# Patient Record
Sex: FEMALE | Race: BLACK OR AFRICAN AMERICAN | Employment: FULL TIME | URBAN - METROPOLITAN AREA
[De-identification: names, ages, dates, MRNs, and addresses within clinical notes are randomized per-mention and may not be internally consistent; named-entity substitution may affect disease eponyms.]

---

## 2017-02-27 ENCOUNTER — OFFICE VISIT (OUTPATIENT)
Dept: ONCOLOGY | Age: 42
End: 2017-02-27

## 2017-02-27 VITALS
DIASTOLIC BLOOD PRESSURE: 76 MMHG | HEIGHT: 65 IN | HEART RATE: 60 BPM | OXYGEN SATURATION: 95 % | RESPIRATION RATE: 18 BRPM | TEMPERATURE: 97.5 F | SYSTOLIC BLOOD PRESSURE: 112 MMHG

## 2017-02-27 DIAGNOSIS — T45.1X5A HOT FLASHES DUE TO TAMOXIFEN: ICD-10-CM

## 2017-02-27 DIAGNOSIS — D69.6 THROMBOCYTOPENIA (HCC): ICD-10-CM

## 2017-02-27 DIAGNOSIS — C50.911 MALIGNANT NEOPLASM OF RIGHT FEMALE BREAST, UNSPECIFIED SITE OF BREAST: Primary | ICD-10-CM

## 2017-02-27 DIAGNOSIS — Z12.39 BREAST SCREENING: ICD-10-CM

## 2017-02-27 DIAGNOSIS — G62.9 NEUROPATHY: ICD-10-CM

## 2017-02-27 DIAGNOSIS — R23.2 HOT FLASHES DUE TO TAMOXIFEN: ICD-10-CM

## 2017-02-27 NOTE — MR AVS SNAPSHOT
Visit Information Date & Time Provider Department Dept. Phone Encounter #  
 2/27/2017  9:00 AM Rayshawn Reyna MD [facility] at [address] [place] [address] Rd [id number] Follow-up Instructions Return in about 6 months (around 8/27/2017) for 6m juwan grimm. Follow-up and Disposition History Your Appointments 5/19/2017 10:00 AM  
Follow Up with Pearl Castro NP [facility] ([facility] SCHEDULING) Appt Note: annual follow up per Anitha Jaime [place] [address]  
  
   
 [place] [address] [place] [address] [facility] Upcoming Health Maintenance Date Due Pneumococcal 19-64 Highest Risk (1 of 3 - PCV13) 10/21/1992 DTaP/Tdap/Td series (1 - Tdap) 10/21/1994 PAP AKA CERVICAL CYTOLOGY 10/21/1994 INFLUENZA AGE 9 TO ADULT 8/1/2016 Allergies as of 2/27/2017  Review Complete On: 2/27/2017 By: Rayshawn Reyna MD  
  
 Severity Noted Reaction Type Reactions Morphine  07/19/2013    Other (comments) Upset stomach Current Immunizations  Reviewed on 1/21/2014 No immunizations on file. Not reviewed this visit You Were Diagnosed With   
  
 Codes Comments Malignant neoplasm of right female breast, unspecified site of breast ([facility]    -  Primary ICD-10-CM: C50.911 ICD-9-CM: 174.9 Hot flashes due to tamoxifen     ICD-10-CM: R23.2, T45.1X5A 
ICD-9-CM: 782.62, E933.1 Neuropathy     ICD-10-CM: G62.9 ICD-9-CM: 355.9 Thrombocytopenia ([facility]     ICD-10-CM: D69.6 ICD-9-CM: 287.5 Breast screening     ICD-10-CM: Z12.39 
ICD-9-CM: V76.10 Vitals BP  
  
  
  
  
  
 [address] Preferred Pharmacy Pharmacy Name Phone [id number] [address] [phone] Your Updated Medication List  
  
   
This list is accurate as of: 2/27/17  9:56 AM.  Always use your most recent med list.  
  
  
 AZOPT 1 % ophthalmic suspension Generic drug:  brinzolamide Administer 1 Drop to both eyes two (2) times a day. COMBIGAN 0.2-0.5 % Drop ophthalmic solution Generic drug:  brimonidine-timolol Administer 1 Drop to both eyes two (2) times a day. LUMIGAN 0.03 % ophthalmic drops Generic drug:  bimatoprost  
Administer 1 Drop to both eyes every evening. TOPAMAX 100 mg tablet Generic drug:  topiramate Take  by mouth as needed (migraines). Follow-up Instructions Return in about 6 months (around 8/27/2017) for juwan ponce. To-Do List   
 07/17/2017 Imaging:  [facility] MAMMO LT SCREENING INCL CAD Patient Instructions Come see us in 6 months. Introducing [facility]! Dear Liberty Queen: Thank you for requesting a Tapit account. Our records indicate that you already have an active Tapit account. You can access your account anytime at [email or web address] Did you know that you can access your hospital and ER discharge instructions at any time in Tapit? You can also review all of your test results from your hospital stay or ER visit. Additional Information If you have questions, please visit the Frequently Asked Questions section of the Tapit website at [email or web address] Remember, Tapit is NOT to be used for urgent needs. For medical emergencies, dial 911. Now available from your iPhone and Android! Please provide this summary of care documentation to your next provider. Your primary care clinician is listed as Rlaph Gipson. If you have any questions after today's visit, please call [phone].

## 2017-02-27 NOTE — PROGRESS NOTES
[address], [address]  [address]  W: [phone]  F: [phone]     F/u  HEME/ONC CONSULT    Reason for visit: management of  Follow-up (breast cancer)    Referring physician:  Dr. Reyna Quezada physician:  Dr. Luisito Copeland, Dr. Lowell Larkin (fertility)    HPI:   Manju Chawla is a 35 y.o.  female who I am seeing in f/u for management of breast cancer. She found the right breast mass herself, and had a biopsy on 13 showing IDC, 1 cm, gr 2-3, ER + at 82%, RI neg at < 1%, ki67 62%, her 2 neg at 1+. Had a right mastectomy on 13 showing IDC 2.5 cm, gr 2, no LVI, 2/7 LN involved with no CAROLYN but greatest size 0.8 cm, ER + at 80%, RI negative, HER 2 neg ratio 1.1, ki67 80%. There was also a separate focus of IDC, 2 cm, gr 3, HER 2 negative    S/p DD AC-T q 2 weeks x 4 (60/600 then 175 mg/m2) from 10/28/13-2/3/14    S/p XRT 14    Started tamoxifen 14, been off since 2015    Last eye exam 11/2/15  Last gyn exam summer 2015    LMP 9/12/16    2/3/15 reconstruction: Benign scar    Interval history: complains of gr 1 fatigue, gr 1 hot flashes, gr 1 insomnia, gr 1 pain/cramping, 4/10 pain in her legs and upper extremities, gr 1 neuropathy, gr 1 swelling, gr 2 headache, gr 2 urgency, gr 2 incontinence. She is still trying to conceive. To see Dr. Gabriele Mcdonnell in March. Has not taken tamoxifen since 2015, due to trying to get pregnant. Started trying to conceive in 2016. [facility] Harshad Simmons [facility]. DX   Encounter Diagnoses   Name Primary?     Malignant neoplasm of right female breast, unspecified site of breast ([address] Yes    Hot flashes due to tamoxifen     Neuropathy     Thrombocytopenia ([address]     Breast screening      Past Medical History:   Diagnosis Date    Anxiety     Bowel obstruction ([facility]     Cancer ([facility]     cancer in right breast    Depression     FH: breast cancer     maternal grandmother,  age 37  GERD (gastroesophageal reflux disease)     Glaucoma     Headache     Hypersomnolent     Joint pain     Leg swelling     Muscle weakness     RIGHT ARM R/T SHOULDER PAIN    Other ill-defined conditions(799.89)     glaucoma, NEUROPATHY    Rectal fissure 2010    Uterine polyp 2005    Visual disturbance      Past Surgical History:   Procedure Laterality Date    HX APPENDECTOMY  1977    HX BREAST RECONSTRUCTION  2013    BREAST RECONSTRUCTION performed by Chantelle Singer MD at [address] HX CYST REMOVAL  2010    ganglion cyst RIGHT wrist    HX GYN  2005    uterine polyp removed, leep    HX GYN      LEEP    HX HEENT  2010    eye surgery    HX MASTECTOMY Right     HX MODIFIED RADICAL MASTECTOMY  2013    BREAST MASTECTOMY MODIFIED RADICAL performed by Olayinka Antunez MD at [address] HX OOPHORECTOMY  2008    removal of LEFT ovary and fallopian tube    HX OTHER SURGICAL      ganglion cysts removed wrists    HX OTHER SURGICAL  2010    anal fissure removed.     HX SMALL BOWEL RESECTION  2010    small bowel obstruction      Social History     Social History    Marital status:      Spouse name: N/A    Number of children: N/A    Years of education: N/A     Social History Main Topics    Smoking status: Never Smoker    Smokeless tobacco: Never Used    Alcohol use No    Drug use: No    Sexual activity: Yes     Partners: Male     Birth control/ protection: None     Other Topics Concern    None     Social History Narrative     Family History   Problem Relation Age of Onset    Cancer Maternal Grandmother      breast cancer,  age 37   [employer] Malignant Hyperthermia Neg Hx     Pseudocholinesterase Deficiency Neg Hx     Delayed Awakening Neg Hx     Post-op Nausea/Vomiting Neg Hx     Emergence Delirium Neg Hx     Post-op Cognitive Dysfunction Neg Hx     Other Neg Hx     Anesth Problems Neg Hx      Current Outpatient Prescriptions   Medication Sig Dispense Refill    topiramate (TOPAMAX) 100 mg tablet Take  by mouth as needed (migraines).  brimonidine-timolol (COMBIGAN) 0.2-0.5 % Drop ophthalmic solution Administer 1 Drop to both eyes two (2) times a day.  brinzolamide (AZOPT) 1 % ophthalmic suspension Administer 1 Drop to both eyes two (2) times a day.  bimatoprost (LUMIGAN) 0.03 % ophthalmic drops Administer 1 Drop to both eyes every evening. Allergies   Allergen Reactions    Morphine Other (comments)     Upset stomach     Review of Systems    A comprehensive review of systems was performed and all systems were negative except for HPI and for the symptom report form, reviewed and scanned in    Objective:  Physical Exam:  Visit Vitals    /76    Pulse 60    Temp 97.5 °F (36.4 °C)    Resp 18    Ht 5' 5\" (1.651 m)    SpO2 95%       General:  Alert, cooperative, no distress, appears stated age. Head:  Normocephalic, without obvious abnormality, atraumatic. Eyes:  Conjunctivae/corneas clear. PERRL, EOMs intact. Throat: Lips, mucosa, and tongue normal.    Neck: Supple, symmetrical, trachea midline, no adenopathy, thyroid: no enlargement/tenderness/nodules   Back:   Symmetric, no curvature. ROM normal. No CVA tenderness. Lungs:   Clear to auscultation bilaterally. Chest wall:  No tenderness or deformity. Heart:  Regular rate and rhythm, S1, S2 normal, no murmur, click, rub or gallop. Breast Exam:  S/p right mastectomy with tissue expander in place. Abdomen:   Soft, non-tender. Bowel sounds normal. No masses,  No organomegaly. Extremities: Extremities normal, atraumatic, no cyanosis or edema noted. Skin: Skin color, texture, turgor normal. No rashes or lesions. Lymph nodes: Cervical, supraclavicular, and axillary nodes normal.   Neurologic: CNII-XII intact.        Diagnostic Imaging   10/21/13 TTE: EF 60%    7/6/15 L mammogram, negative, f/u in 1 year    July 6[facility]    Lab Results  Lab Results   Component Value Date/Time    WBC 3.6 09/28/2016 09:59 AM    HGB 12.2 09/28/2016 09:59 AM    HCT 38.9 09/28/2016 09:59 AM    PLATELET 771 59/51/6552 09:59 AM    MCV 71.9 09/28/2016 09:59 AM     Lab Results   Component Value Date/Time    Sodium 140 01/20/2015 03:59 PM    Potassium 3.9 01/20/2015 03:59 PM    Chloride 107 01/20/2015 03:59 PM    CO2 28 01/20/2015 03:59 PM    Anion gap 5 01/20/2015 03:59 PM    Glucose 78 01/20/2015 03:59 PM    BUN 19 01/20/2015 03:59 PM    Creatinine 0.64 01/20/2015 03:59 PM    BUN/Creatinine ratio 30 01/20/2015 03:59 PM    GFR est AA >60 01/20/2015 03:59 PM    GFR est non-AA >60 01/20/2015 03:59 PM    Calcium 9.1 01/20/2015 03:59 PM    AST (SGOT) 22 01/20/2015 03:59 PM    Alk. phosphatase 54 01/20/2015 03:59 PM    Protein, total 7.3 01/20/2015 03:59 PM    Albumin 4.0 01/20/2015 03:59 PM    Globulin 3.3 01/20/2015 03:59 PM    A-G Ratio 1.2 01/20/2015 03:59 PM    ALT (SGPT) 25 01/20/2015 03:59 PM     Assessment/Plan:  35 y.o. female with multifocal IDC on the right, 2.5 cm and 2 cm, gr 3, ER positive, MA negative, HER 2 negative, 2/7 LN involved. [id number]. Mx  PS 0   BRCA 1/2 and ELIZABETH neg. 1. Breast cancer stage: IIA    Hormonal therapy: administered    No evidence of recurrence. She has stopped her tamoxifen while attempting to get pregnant. Retrospective studies show that becoming pregnant after breast cancer does not equal worse outcomes, even in the ER+ population. She is seeing a fertility specialist. I discussed that we would want to restart her tamoxifen following delivery, should that occur. I discussed that if she does not take her tamoxifen again, her risk of breast cancer recurrence would nearly double, from about 25% to 49%. I am concerned that she will not be able to obtain pregnancy spontaneously, thus, delaying her time to resume tamoxifen. I have reviewed Dr. Ramírez Amaya' note and highly recommend that she return.  She will see Dr. Maria Larios in March. We discussed that there in theory is a risk with fertility treatment with breast cancer, but this is in theory, and it is difficult to quantify the risk. I agree with the approach of \"natural\" conception, but if fertility medications are required, the patient understands that there is an unknown risk, but that in this situation there are no strict contraindications. July 6, 2016 L mammogram normal per patient at the [place]. She would like to switch to getting her mammograms here, will order today, due 07/17. 2. Hot Flashes: improved, mild and intermittent. 3. Neuropathy: ongoing, she has stopped the gabapentin. She takes mobic as needed. 4. Thrombocytopenia: resolved, 172 in 09/2016. if it goes < 100, will order bone marrow biopsy    > 25 minutes were spent with this patient with > 50% of that time spent in face to face counseling. Follow-up Disposition:  Return in about 6 months (around 8/27/2017) for 6m juwan grimm.     Ángel Winters MD

## 2017-02-27 NOTE — PROGRESS NOTES
Leandra Narvaez is a 35 y.o. female  Chief Complaint   Patient presents with    Follow-up     breast cancer

## 2017-05-26 ENCOUNTER — OFFICE VISIT (OUTPATIENT)
Dept: SURGERY | Age: 42
End: 2017-05-26

## 2017-05-26 VITALS
HEART RATE: 70 BPM | BODY MASS INDEX: 32.82 KG/M2 | DIASTOLIC BLOOD PRESSURE: 82 MMHG | WEIGHT: 197 LBS | HEIGHT: 65 IN | SYSTOLIC BLOOD PRESSURE: 116 MMHG

## 2017-05-26 DIAGNOSIS — Z85.3 HISTORY OF BREAST CANCER IN FEMALE: ICD-10-CM

## 2017-05-26 DIAGNOSIS — R92.2 DENSE BREAST TISSUE: Primary | ICD-10-CM

## 2017-05-26 NOTE — PROGRESS NOTES
HISTORY OF PRESENT ILLNESS  Abran Majano is a 35 y.o. female.   HPI       ROS    Physical Exam    ASSESSMENT and PLAN  {ASSESSMENT/PLAN:[id number]}

## 2017-05-26 NOTE — MR AVS SNAPSHOT
Visit Information Date & Time Provider Department Dept. Phone Encounter #  
 5/26/2017  9:30 AM John Hurst NP [facility] [address] [id number] Your Appointments 8/28/2017  9:00 AM  
ESTABLISHED PATIENT with Damaris Pantoja MD  
[facility] at [facility] [address]) Appt Note: 6 mo fu, Quintin Francisca [address] [place] [address], [address] [facility] [id number] [phone]  
  
   
 [address] [place] [address], [address] [place] [address]  
  
    
 6/1/2018  9:30 AM  
Follow Up with John Hurst NP [facility] ([facility] SCHEDULING) Appt Note: 1 year follow up/ cp$12 5-26-17 LS  
 [address]  
  
   
 [place] [address] [place] [address] [facility] Upcoming Health Maintenance Date Due Pneumococcal 19-64 Highest Risk (1 of 3 - PCV13) 10/21/1992 DTaP/Tdap/Td series (1 - Tdap) 10/21/1994 PAP AKA CERVICAL CYTOLOGY 10/21/1994 INFLUENZA AGE 9 TO ADULT 8/1/2017 Allergies as of 5/26/2017  Review Complete On: 5/26/2017 By: John Hurst NP Severity Noted Reaction Type Reactions Morphine  07/19/2013    Other (comments) Upset stomach Current Immunizations  Reviewed on 1/21/2014 No immunizations on file. Not reviewed this visit You Were Diagnosed With   
  
 Codes Comments Dense breast tissue    -  Primary ICD-10-CM: R92.2 ICD-9-CM: 793.82 History of breast cancer in female     ICD-10-CM: Z85.3 ICD-9-CM: V10.3 Vitals BP Pulse Height(growth percentile) Weight(growth percentile) LMP BMI  
 116/82 70 5' 5\" (1.651 m) 197 lb (89.4 kg) 05/08/2017 32.78 kg/m2 OB Status Smoking Status Having regular periods Never Smoker BMI and BSA Data Body Mass Index Body Surface Area 32.78 kg/m 2 2.02 m 2 Preferred Pharmacy Pharmacy Name Phone [id number] [address] [phone] Your Updated Medication List  
  
   
This list is accurate as of: 5/26/17  1:00 PM.  Always use your most recent med list.  
  
  
  
  
 AZOPT 1 % ophthalmic suspension Generic drug:  brinzolamide Administer 1 Drop to both eyes two (2) times a day. COMBIGAN 0.2-0.5 % Drop ophthalmic solution Generic drug:  brimonidine-timolol Administer 1 Drop to both eyes two (2) times a day. LUMIGAN 0.03 % ophthalmic drops Generic drug:  bimatoprost  
Administer 1 Drop to both eyes every evening. To-Do List   
 07/05/2017 9:30 AM  
  Appointment with [facility] 1 at [place] [address] Mammography ([phone]) Shower or bathe using soap and water. Do not use deodorant, powder, perfumes, or lotion the day of your exam.  If your prior mammograms were not performed at [facility] please bring films with you or forward prior images 2 days before your procedure. Check in at registration 15min before your appointment time unless you were instructed to do otherwise. A script is not necessary, but if you have one, please bring it on the day of the mammogram or have it faxed to the department. [facility] [phone] [facility]  [phone] [address]  [phone] [facility] [phone] [address] [phone] Patient Instructions Breast Self-Exam: Care Instructions Your Care Instructions A breast self-exam is when you check your breasts for lumps or changes. This regular exam helps you learn how your breasts normally look and feel. Most breast problems or changes are not because of cancer. Breast self-exam is not a substitute for a mammogram. Having regular breast exams by your doctor and regular mammograms improve your chances of finding any problems with your breasts.  
Some women set a time each month to do a step-by-step breast self-exam. Other women like a less formal system. They might look at their breasts as they brush their teeth, or feel their breasts once in a while in the shower. If you notice a change in your breast, tell your doctor. Follow-up care is a key part of your treatment and safety. Be sure to make and go to all appointments, and call your doctor if you are having problems. Its also a good idea to know your test results and keep a list of the medicines you take. How do you do a breast self-exam? 
· The best time to examine your breasts is usually one week after your menstrual period begins. Your breasts should not be tender then. If you do not have periods, you might do your exam on a day of the month that is easy to remember. · To examine your breasts: ¨ Remove all your clothes above the waist and lie down. When you are lying down, your breast tissue spreads evenly over your chest wall, which makes it easier to feel all your breast tissue. ¨ Use the padsnot the fingertipsof the 3 middle fingers of your left hand to check your right breast. Move your fingers slowly in small coin-sized circles that overlap. ¨ Use three levels of pressure to feel of all your breast tissue. Use light pressure to feel the tissue close to the skin surface. Use medium pressure to feel a little deeper. Use firm pressure to feel your tissue close to your breastbone and ribs. Use each pressure level to feel your breast tissue before moving on to the next spot. ¨ Check your entire breast, moving up and down as if following a strip from the collarbone to the bra line, and from the armpit to the ribs. Repeat until you have covered the entire breast. 
¨ Repeat this procedure for your left breast, using the pads of the 3 middle fingers of your right hand. · To examine your breasts while in the shower: 
¨ Place one arm over your head and lightly soap your breast on that side.  
¨ Using the pads of your fingers, gently move your hand over your breast (in the strip pattern described above), feeling carefully for any lumps or changes. ¨ Repeat for the other breast. 
· Have your doctor inspect anything you notice to see if you need further testing. Where can you learn more? Go to http://morgan-joel.info/. Enter P148 in the search box to learn more about \"Breast Self-Exam: Care Instructions. \" Current as of: July 26, 2016 Content Version: 11.2 © 4772-5797 [employer]. Care instructions adapted under license by ImaCor (which disclaims liability or warranty for this information). If you have questions about a medical condition or this instruction, always ask your healthcare professional. [address] any warranty or liability for your use of this information. Introducing [facility] & HEALTH SERVICES! Dear Ya Chacon: Thank you for requesting a ZMP account. Our records indicate that you already have an active ZMP account. You can access your account anytime at [email or web address] Did you know that you can access your hospital and ER discharge instructions at any time in ZMP? You can also review all of your test results from your hospital stay or ER visit. Additional Information If you have questions, please visit the Frequently Asked Questions section of the ZMP website at [email or web address] Remember, ZMP is NOT to be used for urgent needs. For medical emergencies, dial 911. Now available from your iPhone and Android! Please provide this summary of care documentation to your next provider. Your primary care clinician is listed as Juan Atkins. If you have any questions after today's visit, please call [phone].

## 2017-05-26 NOTE — PATIENT INSTRUCTIONS
Breast Self-Exam: Care Instructions  Your Care Instructions  A breast self-exam is when you check your breasts for lumps or changes. This regular exam helps you learn how your breasts normally look and feel. Most breast problems or changes are not because of cancer. Breast self-exam is not a substitute for a mammogram. Having regular breast exams by your doctor and regular mammograms improve your chances of finding any problems with your breasts. Some women set a time each month to do a step-by-step breast self-exam. Other women like a less formal system. They might look at their breasts as they brush their teeth, or feel their breasts once in a while in the shower. If you notice a change in your breast, tell your doctor. Follow-up care is a key part of your treatment and safety. Be sure to make and go to all appointments, and call your doctor if you are having problems. Its also a good idea to know your test results and keep a list of the medicines you take. How do you do a breast self-exam?  · The best time to examine your breasts is usually one week after your menstrual period begins. Your breasts should not be tender then. If you do not have periods, you might do your exam on a day of the month that is easy to remember. · To examine your breasts:  ¨ Remove all your clothes above the waist and lie down. When you are lying down, your breast tissue spreads evenly over your chest wall, which makes it easier to feel all your breast tissue. ¨ Use the pads--not the fingertips--of the 3 middle fingers of your left hand to check your right breast. Move your fingers slowly in small coin-sized circles that overlap. ¨ Use three levels of pressure to feel of all your breast tissue. Use light pressure to feel the tissue close to the skin surface. Use medium pressure to feel a little deeper. Use firm pressure to feel your tissue close to your breastbone and ribs.  Use each pressure level to feel your breast tissue before moving on to the next spot. ¨ Check your entire breast, moving up and down as if following a strip from the collarbone to the bra line, and from the armpit to the ribs. Repeat until you have covered the entire breast.  ¨ Repeat this procedure for your left breast, using the pads of the 3 middle fingers of your right hand. · To examine your breasts while in the shower:  ¨ Place one arm over your head and lightly soap your breast on that side. ¨ Using the pads of your fingers, gently move your hand over your breast (in the strip pattern described above), feeling carefully for any lumps or changes. ¨ Repeat for the other breast.  · Have your doctor inspect anything you notice to see if you need further testing. Where can you learn more? Go to http://morgan-joel.info/. Enter P148 in the search box to learn more about \"Breast Self-Exam: Care Instructions. \"  Current as of: July 26, 2016  Content Version: 11.2  © 3790-7930 [employer]. Care instructions adapted under license by RealTravel (which disclaims liability or warranty for this information). If you have questions about a medical condition or this instruction, always ask your healthcare professional. [place] [address] any warranty or liability for your use of this information.

## 2017-05-26 NOTE — PROGRESS NOTES
HISTORY OF PRESENT ILLNESS  Diana Atkins is a 35 y.o. female. HPI  ESTABLISHED patient here today for follow up RIGHT breast cancer. She has occasional breast tenderness. Denies any other breast problems at this time. History of breast cancer-  RIGHT breast IDC 1cm ER positive, UT negative, Her2 negative. 13- RIGHT mastectomy, SLNB with reconstruction by Dr. Dori Garcia  Completed adjuvant chemotherapy- DD AC-T from 10/28/13-2/3/14  2014- completed radiation. Followed by Dr. Anastasiia Rebolledo  Started tamoxifen 14, been off since 2015. Followed by Dr. Huy Sandra. 2015- final RIGHT breast reconstruction with Dr. Angela Toth. OB History     Obstetric Comments    Menarche:  4. LMP: 11. # of Children:  0. Age at Delivery of First Child:  n/a. Hysterectomy/oophorectomy:  NO/yes 1. Breast Bx:  yes. Hx of Breast Feeding:  no. BCP:  Yes [id number]. Hormone therapy:  no.        Past Surgical History:   Procedure Laterality Date    HX APPENDECTOMY      HX BREAST RECONSTRUCTION  2013    BREAST RECONSTRUCTION performed by Lori Beyer MD at [facility] CYST REMOVAL  2010    ganglion cyst RIGHT wrist    HX GYN  2005    uterine polyp removed, leep    HX GYN  1998    LEEP    HX HEENT  2010    eye surgery    HX MASTECTOMY Right     HX MODIFIED RADICAL MASTECTOMY  2013    BREAST MASTECTOMY MODIFIED RADICAL performed by Noel Chairez MD at [address] HX OOPHORECTOMY  2008    removal of LEFT ovary and fallopian tube    HX OTHER SURGICAL      ganglion cysts removed wrists    HX OTHER SURGICAL  2010    anal fissure removed.  HX SMALL BOWEL RESECTION  2010    small bowel obstruction        FH includes-   Maternal grandmother diagnosed with breast cancer,  from it age 37. Patient has had genetic testing- negative. Recent imaging-  LEFT screening mammogram in 2015- BIRADS 1  LEFT screening mammogram scheduled for 17. ROS    Physical Exam   Constitutional: She is oriented to person, place, and time. She appears well-developed and well-nourished. Pulmonary/Chest: Left breast exhibits no inverted nipple, no mass, no nipple discharge, no skin change and no tenderness. Musculoskeletal: Normal range of motion. UE x 2   Lymphadenopathy:     She has no cervical adenopathy. She has no axillary adenopathy. Right: No supraclavicular adenopathy present. Left: No supraclavicular adenopathy present. Neurological: She is alert and oriented to person, place, and time. Skin: Skin is warm, dry and intact. Chest and breasts examined   Psychiatric: She has a normal mood and affect. Her speech is normal and behavior is normal.     Visit Vitals    /82    Pulse 70    Ht 5' 5\" (1.651 m)    Wt 197 lb (89.4 kg)    LMP 05/08/2017    BMI 32.78 kg/m2     ASSESSMENT and PLAN  Encounter Diagnoses   Name Primary?  Dense breast tissue Yes    History of breast cancer in female      Normal exam in this patient with a history of right breast cancer. She had a left breast mammogram in 2016 at the [place] and reports that it was normal.  She is scheduled to have a L[facility]ammogram this summer at [facility]. She is currently off tamoxifen and working with YARIZTA to conceive. Dr. Bernardino Ibanez is aware. She will plan to RTC here in 1 year or sooner PRN. She has follow-up with Dr. Bernardino Ibanez in the interim. She is comfortable with this plan. All questions answered and she stated understanding.

## 2017-07-05 ENCOUNTER — HOSPITAL ENCOUNTER (OUTPATIENT)
Dept: MAMMOGRAPHY | Age: 42
Discharge: HOME | End: 2017-07-05
Attending: NURSE PRACTITIONER

## 2017-07-05 DIAGNOSIS — Z12.39 BREAST SCREENING: ICD-10-CM

## 2017-07-05 DIAGNOSIS — G62.9 NEUROPATHY: ICD-10-CM

## 2017-07-05 DIAGNOSIS — C50.911 MALIGNANT NEOPLASM OF RIGHT FEMALE BREAST, UNSPECIFIED SITE OF BREAST: ICD-10-CM

## 2017-07-05 DIAGNOSIS — D69.6 THROMBOCYTOPENIA (HCC): ICD-10-CM

## 2017-07-05 DIAGNOSIS — R23.2 HOT FLASHES DUE TO TAMOXIFEN: ICD-10-CM

## 2017-07-05 DIAGNOSIS — T45.1X5A HOT FLASHES DUE TO TAMOXIFEN: ICD-10-CM

## 2017-08-22 ENCOUNTER — HOSPITAL ENCOUNTER (OUTPATIENT)
Dept: MAMMOGRAPHY | Age: 42
Discharge: HOME | End: 2017-08-22
Attending: NURSE PRACTITIONER
Payer: OTHER GOVERNMENT

## 2017-08-22 DIAGNOSIS — R23.2 FLUSHING: ICD-10-CM

## 2017-08-22 DIAGNOSIS — T45.1X5A ANTINEOPLASTIC ANTIBIOTICS CAUSING ADVERSE EFFECT IN THERAPEUTIC USE: ICD-10-CM

## 2017-08-22 DIAGNOSIS — C50.911 MALIGNANT NEOPLASM OF RIGHT BREAST (HCC): ICD-10-CM

## 2017-08-22 DIAGNOSIS — Z12.39 SCREENING BREAST EXAMINATION: ICD-10-CM

## 2017-08-22 DIAGNOSIS — G62.9 ACQUIRED POLYNEUROPATHY: ICD-10-CM

## 2017-08-22 DIAGNOSIS — D59.6: ICD-10-CM

## 2017-08-22 PROCEDURE — 77067 SCR MAMMO BI INCL CAD: CPT

## 2017-08-28 ENCOUNTER — TELEPHONE (OUTPATIENT)
Dept: ONCOLOGY | Age: 42
End: 2017-08-28

## 2017-08-28 NOTE — TELEPHONE ENCOUNTER
Called patient and left a voicemail requesting a call back, to reschedule no show appointment on 8/28/17.

## 2017-08-28 NOTE — TELEPHONE ENCOUNTER
Patient called and stated that she had tried to reschedule her apt when she got a reminder call awhile ago on the phone. She rescheduled for 9/19/17 at 11:00AM with Dr. Corine Gutierrez.

## 2017-10-10 ENCOUNTER — OFFICE VISIT (OUTPATIENT)
Dept: ONCOLOGY | Age: 42
End: 2017-10-10

## 2017-10-10 VITALS
BODY MASS INDEX: 33.66 KG/M2 | SYSTOLIC BLOOD PRESSURE: 118 MMHG | WEIGHT: 202 LBS | HEART RATE: 70 BPM | DIASTOLIC BLOOD PRESSURE: 81 MMHG | OXYGEN SATURATION: 98 % | TEMPERATURE: 97.1 F | RESPIRATION RATE: 18 BRPM | HEIGHT: 65 IN

## 2017-10-10 DIAGNOSIS — T45.1X5A HOT FLASHES DUE TO TAMOXIFEN: ICD-10-CM

## 2017-10-10 DIAGNOSIS — R23.2 HOT FLASHES DUE TO TAMOXIFEN: ICD-10-CM

## 2017-10-10 DIAGNOSIS — D69.6 THROMBOCYTOPENIA (HCC): ICD-10-CM

## 2017-10-10 DIAGNOSIS — C50.911 MALIGNANT NEOPLASM OF RIGHT BREAST IN FEMALE, ESTROGEN RECEPTOR POSITIVE, UNSPECIFIED SITE OF BREAST (HCC): Primary | ICD-10-CM

## 2017-10-10 DIAGNOSIS — Z17.0 MALIGNANT NEOPLASM OF RIGHT BREAST IN FEMALE, ESTROGEN RECEPTOR POSITIVE, UNSPECIFIED SITE OF BREAST (HCC): Primary | ICD-10-CM

## 2017-10-10 DIAGNOSIS — G62.9 NEUROPATHY: ICD-10-CM

## 2017-10-10 RX ORDER — CLOMIPHENE CITRATE 50 MG/1
TABLET ORAL
Refills: 1 | COMMUNITY
Start: 2017-09-26

## 2017-10-10 NOTE — PROGRESS NOTES
[address]  [facility], [address] [place] [address] [address]  W: [phone]  F: [phone]     F/u  HEME/ONC CONSULT    Reason for visit: management of  No chief complaint on file. Referring physician:  Dr. Diogenes Oconnor physician:  Dr. Mathew Brewster, Dr. Elmira Clay (fertility)    HPI:   Breanna Peter is a 35 y.o.  female who I am seeing in f/u for management of breast cancer. She found the right breast mass herself, and had a biopsy on 13 showing IDC, 1 cm, gr 2-3, ER + at 82%, DC neg at < 1%, ki67 62%, her 2 neg at 1+. Had a right mastectomy on 13 showing IDC 2.5 cm, gr 2, no LVI, 2/7 LN involved with no CAROLYN but greatest size 0.8 cm, ER + at 80%, DC negative, HER 2 neg ratio 1.1, ki67 80%. There was also a separate focus of IDC, 2 cm, gr 3, HER 2 negative    S/p DD AC-T q 2 weeks x 4 (60/600 then 175 mg/m2) from 10/28/13-2/3/14    S/p XRT 14    Started tamoxifen 14, been off since 2015    Last eye exam 11/2/15  Last gyn exam:     LMP 9/12/16    2/3/15 reconstruction: Benign scar    Interval history: complains of gr 1 fatigue, gr 1 hot flashes, gr 1 insomnia, gr 1 pain/cramping, 5/10 pain in her legs and upper extremities, gr 2 neuropathy, gr 1 swelling, gr 2 headache, gr 2 urgency, gr 1 incontinence. She is seeing Dr. Cortes Wilson and is starting fertility meds at this time. DX   No diagnosis found.   Past Medical History:   Diagnosis Date    Anxiety     Bowel obstruction     Cancer [facility])     cancer in right breast    Depression     FH: breast cancer     maternal grandmother,  age 37    GERD (gastroesophageal reflux disease)     Glaucoma     Headache     Hypersomnolent     Joint pain     Leg swelling     Muscle weakness     RIGHT ARM R/T SHOULDER PAIN    Other ill-defined conditions(279.94)     glaucoma, NEUROPATHY    Radiation therapy complication     Rectal fissure     Uterine polyp     Visual disturbance Past Surgical History:   Procedure Laterality Date    HX APPENDECTOMY  1977    HX BREAST BIOPSY      HX BREAST RECONSTRUCTION  2013    BREAST RECONSTRUCTION performed by Chris Pineda MD at [address] HX CYST REMOVAL  2010    ganglion cyst RIGHT wrist    HX GYN  2005    uterine polyp removed, leep    HX GYN  1998    LEEP    HX HEENT  2010    eye surgery    HX MASTECTOMY Right     HX MODIFIED RADICAL MASTECTOMY  2013    BREAST MASTECTOMY MODIFIED RADICAL performed by Roberto Valdivia MD at [address] HX OOPHORECTOMY  2008    removal of LEFT ovary and fallopian tube    HX OTHER SURGICAL      ganglion cysts removed wrists    HX OTHER SURGICAL  2010    anal fissure removed.  HX SMALL BOWEL RESECTION  2010    small bowel obstruction      Social History     Social History    Marital status:      Spouse name: N/A    Number of children: N/A    Years of education: N/A     Social History Main Topics    Smoking status: Never Smoker    Smokeless tobacco: Never Used    Alcohol use No    Drug use: No    Sexual activity: Yes     Partners: Male     Birth control/ protection: None     Other Topics Concern    None     Social History Narrative     Family History   Problem Relation Age of Onset    Cancer Maternal Grandmother      breast cancer,  age 37    Breast Cancer Maternal Grandmother     Malignant Hyperthermia Neg Hx     Pseudocholinesterase Deficiency Neg Hx     Delayed Awakening Neg Hx     Post-op Nausea/Vomiting Neg Hx     Emergence Delirium Neg Hx     Post-op Cognitive Dysfunction Neg Hx     Other Neg Hx     Anesth Problems Neg Hx      Current Outpatient Prescriptions   Medication Sig Dispense Refill    clomiPHENE (CLOMID) 50 mg tablet TK 2 TS PO QD FOR 5 DAYS  1    brimonidine-timolol (COMBIGAN) 0.2-0.5 % Drop ophthalmic solution Administer 1 Drop to both eyes two (2) times a day.       brinzolamide (AZOPT) 1 % ophthalmic suspension Administer 1 Drop to both eyes two (2) times a day.  bimatoprost (LUMIGAN) 0.03 % ophthalmic drops Administer 1 Drop to both eyes every evening. Allergies   Allergen Reactions    Morphine Other (comments)     Upset stomach     Review of Systems    A comprehensive review of systems was performed and all systems were negative except for HPI and for the symptom report form, reviewed and scanned in    Objective:  Physical Exam:  Visit Vitals    Wt 202 lb (91.6 kg)    BMI 33.61 kg/m2       General:  Alert, cooperative, no distress, appears stated age. Head:  Normocephalic, without obvious abnormality, atraumatic. Eyes:  Conjunctivae/corneas clear. PERRL, EOMs intact. Throat: Lips, mucosa, and tongue normal.    Neck: Supple, symmetrical, trachea midline, no adenopathy, thyroid: no enlargement/tenderness/nodules   Back:   Symmetric, no curvature. ROM normal. No CVA tenderness. Lungs:   Clear to auscultation bilaterally. Chest wall:  No tenderness or deformity. Heart:  Regular rate and rhythm, S1, S2 normal, no murmur, click, rub or gallop. Breast Exam:  S/p right mastectomy with tissue expander in place. Abdomen:   Soft, non-tender. Bowel sounds normal. No masses,  No organomegaly. Extremities: Extremities normal, atraumatic, no cyanosis or edema noted. Skin: Skin color, texture, turgor normal. No rashes or lesions. Lymph nodes: Cervical, supraclavicular, and axillary nodes normal.   Neurologic: CNII-XII intact.        Diagnostic Imaging   10/21/13 TTE: EF 60%    7/6/15 L mammogram, negative, f/u in 1 year    July 6, 2016 L mammogram normal per patient at the [place]    Lab Results  Lab Results   Component Value Date/Time    WBC 3.6 09/28/2016 09:59 AM    HGB 12.2 09/28/2016 09:59 AM    HCT 38.9 09/28/2016 09:59 AM    PLATELET 157 80/29/1172 09:59 AM    MCV 71.9 09/28/2016 09:59 AM     Lab Results   Component Value Date/Time    Sodium 140 01/20/2015 03:59 PM    Potassium 3.9 01/20/2015 03:59 PM    Chloride 107 01/20/2015 03:59 PM    CO2 28 01/20/2015 03:59 PM    Anion gap 5 01/20/2015 03:59 PM    Glucose 78 01/20/2015 03:59 PM    BUN 19 01/20/2015 03:59 PM    Creatinine 0.64 01/20/2015 03:59 PM    BUN/Creatinine ratio 30 01/20/2015 03:59 PM    GFR est AA >60 01/20/2015 03:59 PM    GFR est non-AA >60 01/20/2015 03:59 PM    Calcium 9.1 01/20/2015 03:59 PM    AST (SGOT) 22 01/20/2015 03:59 PM    Alk. phosphatase 54 01/20/2015 03:59 PM    Protein, total 7.3 01/20/2015 03:59 PM    Albumin 4.0 01/20/2015 03:59 PM    Globulin 3.3 01/20/2015 03:59 PM    A-G Ratio 1.2 01/20/2015 03:59 PM    ALT (SGPT) 25 01/20/2015 03:59 PM     Assessment/Plan:  35 y.o. female with multifocal IDC on the right, 2.5 cm and 2 cm, gr 3, ER positive, CT negative, HER 2 negative, 2/7 LN involved. [id number]. Mx  PS 0   BRCA 1/2 and ELIZABETH neg. 1. Breast cancer stage: IIA    Hormonal therapy: administered    No evidence of recurrence. She has stopped her tamoxifen while attempting to get pregnant. Retrospective studies show that becoming pregnant after breast cancer does not equal worse outcomes, even in the ER+ population. She is seeing a fertility specialist. I discussed that we would want to restart her tamoxifen following delivery, should that occur. I discussed that if she does not take her tamoxifen again, her risk of breast cancer recurrence would nearly double, from about 25% to 49%. I am concerned that she will not be able to obtain pregnancy spontaneously, thus, delaying her time to resume tamoxifen. I have reviewed Dr. Marcos Heart' note. She is following with Dr. Key Morrison. We discussed that there in theory is a risk with fertility treatment with breast cancer, but this is in theory, and it is difficult to quantify the risk.  I agree with the approach of \"natural\" conception, but if fertility medications are required, the patient understands that there is an unknown risk, but that in this situation there are no strict contraindications. 8/22/17 L mammogram negative, repeat for 08/18 ordered. 2. Hot Flashes: improved, mild and intermittent. 3. Neuropathy: ongoing at night, she has stopped the gabapentin. She takes mobic as needed. 4. Thrombocytopenia: resolved, 172 in 09/2016. if it goes < 100, will order bone marrow biopsy. > 15 minutes were spent with this patient with > 50% of that time spent in face to face counseling.     Follow-up Disposition: Not on Kenzie Harris MD

## 2017-10-10 NOTE — MR AVS SNAPSHOT
Visit Information Date & Time Provider Department Dept. Phone Encounter #  
 10/10/2017 10:00 AM Celina Cabezas MD [facility] at [address] [place] [address] Rd [id number] Follow-up Instructions Return for 1 yr juwan grimm. Your Appointments 6/1/2018  9:30 AM  
Follow Up with Endy Fleming  [facility] ([facility] SCHEDULING) Appt Note: 1 year follow up/ cp$12 5-26-17   
 [address] Count includes the [facility] [address]  
  
   
 [place] [address] [address] [facility] Upcoming Health Maintenance Date Due Pneumococcal 19-64 Highest Risk (1 of 3 - PCV13) 10/21/1992 DTaP/Tdap/Td series (1 - Tdap) 10/21/1994 PAP AKA CERVICAL CYTOLOGY 10/21/1994 INFLUENZA AGE 9 TO ADULT 8/1/2017 Allergies as of 10/10/2017  Review Complete On: 10/10/2017 By: Celina Cabezas MD  
  
 Severity Noted Reaction Type Reactions Morphine  07/19/2013    Other (comments) Upset stomach Current Immunizations  Reviewed on 1/21/2014 No immunizations on file. Not reviewed this visit You Were Diagnosed With   
  
 Codes Comments Malignant neoplasm of right breast in female, estrogen receptor positive, unspecified site of breast ([facility]    -  Primary ICD-10-CM: C50.911, Z17.0 ICD-9-CM: 174.9, V86.0 Neuropathy     ICD-10-CM: G62.9 ICD-9-CM: 355.9 Hot flashes due to tamoxifen     ICD-10-CM: R23.2, T45.1X5A 
ICD-9-CM: 782.62, E933.1 Thrombocytopenia ([facility]     ICD-10-CM: D69.6 ICD-9-CM: 287.5 Vitals BP Pulse Temp Resp Height(growth percentile) Weight(growth percentile) 118/81 70 97.1 °F (36.2 °C) (Temporal) 18 5' 5\" (1.651 m) 202 lb (91.6 kg) SpO2 BMI OB Status Smoking Status 98% 33.61 kg/m2 Unknown Never Smoker Vitals History BMI and BSA Data Body Mass Index Body Surface Area  
 33.61 kg/m 2 2.05 m 2 Preferred Pharmacy Pharmacy Name Phone [phone] [phone] Your Updated Medication List  
  
   
This list is accurate as of: 10/10/17 11:10 AM.  Always use your most recent med list.  
  
  
  
  
 AZOPT 1 % ophthalmic suspension Generic drug:  brinzolamide Administer 1 Drop to both eyes two (2) times a day. clomiPHENE 50 mg tablet Commonly known as:  CLOMID TK 2 TS PO QD FOR 5 DAYS  
  
 COMBIGAN 0.2-0.5 % Drop ophthalmic solution Generic drug:  brimonidine-timolol Administer 1 Drop to both eyes two (2) times a day. LUMIGAN 0.03 % ophthalmic drops Generic drug:  bimatoprost  
Administer 1 Drop to both eyes every evening. Follow-up Instructions Return for 1 yr juwan grimm. To-Do List   
 08/23/2018 Imaging:  [facility] LT SCREENING INCL CAD Introducing [facility]! Dear Angelika Green: Thank you for requesting a [employer] account. Our records indicate that you already have an active [employer] account. You can access your account anytime at [email or web address] Did you know that you can access your hospital and ER discharge instructions at any time in [employer]? You can also review all of your test results from your hospital stay or ER visit. Additional Information If you have questions, please visit the Frequently Asked Questions section of the [employer] website at [email or web address] Remember, [employer] is NOT to be used for urgent needs. For medical emergencies, dial 911. Now available from your iPhone and Android! Please provide this summary of care documentation to your next provider. Your primary care clinician is listed as Honey Anguiano. If you have any questions after today's visit, please call [phone].

## 2017-10-24 ENCOUNTER — HOSPITAL ENCOUNTER (OUTPATIENT)
Dept: MRI IMAGING | Age: 42
Discharge: HOME | End: 2017-10-24
Attending: INTERNAL MEDICINE
Payer: OTHER GOVERNMENT

## 2017-10-24 DIAGNOSIS — M54.2 CERVICALGIA: ICD-10-CM

## 2017-10-24 PROCEDURE — 72141 MRI NECK SPINE W/O DYE: CPT

## 2018-03-29 ENCOUNTER — OFFICE VISIT (OUTPATIENT)
Dept: NEUROLOGY | Age: 43
End: 2018-03-29

## 2018-03-29 DIAGNOSIS — M79.621 PAIN OF RIGHT UPPER ARM: Primary | ICD-10-CM

## 2018-03-29 NOTE — PROCEDURES
EMG/ NCS Report   [facility] [address] [place], [address]   Ph: [phone]   FAX: [phone]  Test Date:  3/29/2018    Patient: Thelma Meadows : 1975 Physician: Gracie Shepard MD   Sex: Male Height: ' \" Ref Phys: Evgeny Kwan   ID#: [id number] Weight:  lbs. Technician: Genesis Arango     Patient History / Exam:  Patient has history of breast CA 2013 s/p right radical mastectomy and lymph node resection, chemo and radiation who comes in for 3 yrs right lateral neck pain that radiates down the right upper extremity associated with intermittent numbness of the right hand. (+) weakness of     Patient is coming for neuropathy and radiculopathy evaluation. EMG & NCV Findings:  Evaluation of the right median motor nerve showed normal distal onset latency (3.0 ms), normal amplitude (14.3 mV), and normal conduction velocity (Elbow-Wrist, 50 m/s). The right ulnar motor nerve showed normal distal onset latency (2.6 ms), normal amplitude (10.1 mV), normal conduction velocity (B Elbow-Wrist, 62 m/s), and normal conduction velocity (A Elbow-B Elbow, 59 m/s). The right median sensory, the right radial sensory, and the right ulnar sensory nerves showed normal distal peak latency (R3.0, R2.1, R3.1 ms) and normal amplitude (R34.3, R70.9, R35.0 µV). The right median/ulnar (palm) comparison nerve showed normal distal onset latency (Median Palm, 1.1 ms), normal distal peak latency (Median Palm, 1.6 ms), normal amplitude (Median Palm, 44.3 µV), normal distal onset latency (Ulnar Palm, 1.2 ms), normal distal peak latency (Ulnar Palm, 1.7 ms), and normal amplitude (Ulnar Palm, 17.1 µV). All F Wave latencies were within normal limits.         Impression:    Electrodiagnostic examination of the right upper extremity is limited due to inability to perform needle examination because of history of right mastectomy and lymph node resection,    Nerve conduction study, including palmar study of the right upper extremity, is within normal limits with no evidence of a peripheral neuropathy. A cervical motor radiculopathy cannot be excluded without the needle examination.           Richard Key MD  Diplomate, American Board of Psychiatry and Neurology  Diplomate, Neuromuscular Medicine  Diplomate, American Board of Electrodiagnostic Medicine  , [address] [place] [address] Accredited Laboratory with Exemplary Status        Nerve Conduction Studies  Anti Sensory Summary Table     Stim Site NR Peak (ms) Norm Peak (ms) P-T Amp (µV) Norm P-T Amp Site1 Site2 Dist (cm)   Right Median Anti Sensory (2nd Digit)  28.8°C   Wrist    3.0 <4 34.3 >13 Wrist 2nd Digit 14.0   Right Radial Anti Sensory (Base 1st Digit)  29.2°C   Wrist    2.1 <2.8 70.9 >11 Wrist Base 1st Digit 10.0   Right Ulnar Anti Sensory (5th Digit)  29°C   Wrist    3.1 <4.0 35.0 >9 Wrist 5th Digit 14.0     Motor Summary Table     Stim Site NR Onset (ms) Norm Onset (ms) O-P Amp (mV) Norm O-P Amp Amp (Prev) (%) Site1 Site2 Dist (cm) Mitch (m/s) Norm Mitch (m/s)   Right Median Motor (Abd Poll Brev)  30°C   Wrist    3.0 <4.5 14.3 >4.1 100.0 Wrist Abd Poll Brev 8.0     Elbow    7.1  11.0  76.9 Elbow Wrist 20.5 50 >49   Right Ulnar Motor (Abd Dig Minimi)  30°C   Wrist    2.6 <3.1 10.1 >7.0 100.0 Wrist Abd Dig Minimi 8.0  >50   B Elbow    6.0  9.7  96.0 B Elbow Wrist 21.0 62 >50   A Elbow    7.7  9.3  95.9 A Elbow B Elbow 10.0 59 >50     Comparison Summary Table     Stim Site NR Peak (ms) P-T Amp (µV) Site1 Site2 Dist (cm) Delta-0 (ms)   Right Median/Ulnar Palm Comparison (Wrist)  30.8°C   Median Palm    1.6 51.5 Median Palm Ulnar Palm 8.0 0.1   Ulnar Palm    1.7 21.9         F Wave Studies     NR F-Lat (ms) Lat Norm (ms) L-R F-Lat (ms) L-R Lat Norm   Right Ulnar (Mrkrs) (Abd Dig Min)  30.2°C      26.44 <32  <2.5               Nerve Conduction Studies  Anti Sensory Left/Right Comparison     Stim Site L Lat (ms) R Lat (ms) L-R Lat (ms) L Amp (µV) R Amp (µV) L-R Amp (%) Site1 Site2 L Mitch (m/s) R Mitch (m/s) L-R Mitch (m/s)   Median Anti Sensory (2nd Digit)  28.8°C   Wrist  2.4   34.3  Wrist 2nd Digit  58    Radial Anti Sensory (Base 1st Digit)  29.2°C   Wrist  1.5   70.9  Wrist Base 1st Digit  67    Ulnar Anti Sensory (5th Digit)  29°C   Wrist  2.4   35.0  Wrist 5th Digit  58      Motor Left/Right Comparison     Stim Site L Lat (ms) R Lat (ms) L-R Lat (ms) L Amp (mV) R Amp (mV) L-R Amp (%) Site1 Site2 L Mitch (m/s) R Mitch (m/s) L-R Mitch (m/s)   Median Motor (Abd Poll Brev)  30°C   Wrist  3.0   14.3  Wrist Abd Poll Brev      Elbow  7.1   11.0  Elbow Wrist  50    Ulnar Motor (Abd Dig Minimi)  30°C   Wrist  2.6   10.1  Wrist Abd Dig Minimi      B Elbow  6.0   9.7  B Elbow Wrist  62    A Elbow  7.7   9.3  A Elbow B Elbow  59      Comparison Left/Right Comparison     Stim Site L Lat (ms) R Lat (ms) L-R Lat (ms) L Amp (µV) R Amp (µV) L-R Amp (%)   Median/Ulnar Palm Comparison (Wrist)  30.8°C   Median Palm  1.1   44.3    Ulnar Palm  1.2   17.1          Waveforms:

## 2018-06-15 ENCOUNTER — OFFICE VISIT (OUTPATIENT)
Dept: SURGERY | Age: 43
End: 2018-06-15

## 2018-06-15 VITALS
HEIGHT: 65 IN | BODY MASS INDEX: 33.15 KG/M2 | HEART RATE: 69 BPM | WEIGHT: 199 LBS | SYSTOLIC BLOOD PRESSURE: 116 MMHG | DIASTOLIC BLOOD PRESSURE: 80 MMHG

## 2018-06-15 DIAGNOSIS — Z85.3 HISTORY OF BREAST CANCER IN FEMALE: ICD-10-CM

## 2018-06-15 DIAGNOSIS — N60.12 FIBROCYSTIC BREAST, LEFT: Primary | ICD-10-CM

## 2018-06-15 NOTE — PROGRESS NOTES
HISTORY OF PRESENT ILLNESS  Cynthia Murrell is a 38 y.o. female. Breast Cancer     ESTABLISHED patient here for annual follow up RIGHT breast cancer. Patient is doing well. No breast pain but is having some RIGHT shoulder pain which she says she had a bone that is rubbing. Denies palpable lumps and skin changes. Has her mammogram scheduled for August 2018. History of breast cancer-  RIGHT breast IDC 1cm ER positive, [place] negative, Her2 negative. 9/17/13- RIGHT mastectomy, SLNB with reconstruction by Dr. Mis Howard  Completed adjuvant chemotherapy- DD AC-T from 10/28/13-2/3/14  4/2014- completed radiation. Followed by Dr. Page Gallego  Started tamoxifen 4/28/14, been off since 11/2015. Followed by Dr. Olivier Gordon. 2/2015- final RIGHT breast reconstruction with Dr. Kathy Irene. 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 She also has been "[employer] for herself and others. OB History     Obstetric Comments    Menarche:  4. LMP: 6/20/13. # of Children:  0. Age at Delivery of First Child:  n/a. Hysterectomy/oophorectomy:  NO/yes 1. Breast Bx:  yes. Hx of Breast Feeding:  no. BCP:  Yes [id number]. Hormone therapy:  no.        Past Surgical History:   Procedure Laterality Date    HX APPENDECTOMY  1977    HX BREAST BIOPSY      HX BREAST RECONSTRUCTION  9/17/2013    BREAST RECONSTRUCTION performed by Al Santillan MD at [address] HX CYST REMOVAL  2010    ganglion cyst RIGHT wrist    HX GYN  2005    uterine polyp removed, leep    HX GYN  1998    LEEP    HX HEENT  2010    eye surgery    HX MASTECTOMY Right     HX MODIFIED RADICAL MASTECTOMY  9/17/2013    BREAST MASTECTOMY MODIFIED RADICAL performed by Zane Pimentel MD at [address] HX OOPHORECTOMY  2008    removal of LEFT ovary and fallopian tube    HX OTHER SURGICAL      ganglion cysts removed wrists    HX OTHER SURGICAL  2010    anal fissure removed.     HX SMALL BOWEL RESECTION  2010    small bowel obstruction        FH includes-   Maternal grandmother diagnosed with breast cancer,  from it age 37. Patient has had genetic testing- negative. Mammogram, left screening, 17, BIRADS 1  ROS    Physical Exam   Constitutional: She appears well-developed and well-nourished. Pulmonary/Chest: Left breast exhibits no inverted nipple, no mass, no nipple discharge, no skin change and no tenderness. Musculoskeletal: Normal range of motion. UE x 2  (right shoulder pain due to possible bone spurs - followed by ortho)   Lymphadenopathy:     She has no cervical adenopathy. She has no axillary adenopathy. Right: No supraclavicular adenopathy present. Left: No supraclavicular adenopathy present. Skin: Skin is warm, dry and intact. Chest and breasts examined   Psychiatric: She has a normal mood and affect. Her speech is normal and behavior is normal.     Visit Vitals    /80    Pulse 69    Ht 5' 5\" (1.651 m)    Wt 199 lb (90.3 kg)    BMI 33.12 kg/m2     ASSESSMENT and PLAN  Encounter Diagnoses   Name Primary?  Fibrocystic breast, left Yes    History of breast cancer in female      Normal exam and imaging with no evidence of breast malignancy. Patient is feeling well and continues to see Dr. Selam Harris. Not currently taking tamoxifen and is taking clomid. LSmammogram scheduled for 2018. RTC in 1 year and then can likely follow-up PRN. She is comfortable with this plan. All questions answered and she stated understanding.

## 2018-06-15 NOTE — PROGRESS NOTES
HISTORY OF PRESENT ILLNESS  Judy Cockayne is a 38 y.o. female. HPI  ESTABLISHED patient here for annual follow up RIGHT breast cancer. Patient is doing well. No breast pain but is having some RIGHT shoulder pain which she says she had a bone that is rubbing. Denies palpable lumps and skin changes. Has her mammogram scheduled for 2018. History of breast cancer-  RIGHT breast IDC 1cm ER positive, [place] negative, Her2 negative. 13- RIGHT mastectomy, SLNB with reconstruction by Dr. Remigio Zhou  Completed adjuvant chemotherapy- DD AC-T from 10/28/13-2/3/14  2014- completed radiation. Followed by Dr. Sofi Jay  Started tamoxifen 14, been off since 2015. Followed by Dr. Rosalva Stout. 2015- final RIGHT breast reconstruction with Dr. Jaymie Mazariegos. FH includes-   Maternal grandmother diagnosed with breast cancer,  from it age 37. Patient has had genetic testing- negative.      Mammogram, left, 17, BIRADS 1  ROS    Physical Exam    ASSESSMENT and PLAN  {ASSESSMENT/PLAN:86807}

## 2018-08-24 ENCOUNTER — HOSPITAL ENCOUNTER (OUTPATIENT)
Dept: MAMMOGRAPHY | Age: 43
Discharge: HOME | End: 2018-08-24
Attending: NURSE PRACTITIONER
Payer: OTHER GOVERNMENT

## 2018-08-24 DIAGNOSIS — R23.2 HOT FLASHES DUE TO TAMOXIFEN: ICD-10-CM

## 2018-08-24 DIAGNOSIS — T45.1X5A HOT FLASHES DUE TO TAMOXIFEN: ICD-10-CM

## 2018-08-24 DIAGNOSIS — G62.9 NEUROPATHY: ICD-10-CM

## 2018-08-24 DIAGNOSIS — Z17.0 MALIGNANT NEOPLASM OF RIGHT BREAST IN FEMALE, ESTROGEN RECEPTOR POSITIVE, UNSPECIFIED SITE OF BREAST (HCC): ICD-10-CM

## 2018-08-24 DIAGNOSIS — D69.6 THROMBOCYTOPENIA (HCC): ICD-10-CM

## 2018-08-24 DIAGNOSIS — C50.911 MALIGNANT NEOPLASM OF RIGHT BREAST IN FEMALE, ESTROGEN RECEPTOR POSITIVE, UNSPECIFIED SITE OF BREAST (HCC): ICD-10-CM

## 2018-08-24 PROCEDURE — 77067 SCR MAMMO BI INCL CAD: CPT

## 2018-09-12 ENCOUNTER — DOCUMENTATION ONLY (OUTPATIENT)
Dept: SURGERY | Age: 43
End: 2018-09-12

## 2018-09-12 NOTE — PROGRESS NOTES
Received a fax from [address] requested the Progress Notes. Faxed over notes to [phone].

## 2018-10-10 ENCOUNTER — OFFICE VISIT (OUTPATIENT)
Dept: ONCOLOGY | Age: 43
End: 2018-10-10

## 2018-10-10 VITALS
BODY MASS INDEX: 33.62 KG/M2 | TEMPERATURE: 97.4 F | HEART RATE: 69 BPM | OXYGEN SATURATION: 99 % | HEIGHT: 65 IN | RESPIRATION RATE: 16 BRPM | WEIGHT: 201.8 LBS | DIASTOLIC BLOOD PRESSURE: 66 MMHG | SYSTOLIC BLOOD PRESSURE: 105 MMHG

## 2018-10-10 DIAGNOSIS — C50.919 MALIGNANT NEOPLASM OF FEMALE BREAST, UNSPECIFIED ESTROGEN RECEPTOR STATUS, UNSPECIFIED LATERALITY, UNSPECIFIED SITE OF BREAST (HCC): Primary | ICD-10-CM

## 2018-10-10 NOTE — PROGRESS NOTES
[employer]  [facility], [address] [place] [address]  [address]  W: [phone]  F: [phone]     F/u  HEME/ONC CONSULT    Reason for visit: management of  Breast Cancer    Referring physician:  Dr. Hilda Murillo physician:  Dr. Laura Mckenzie, Dr. Pb Woodson (fertility)    HPI:   Lenita Mohs is a 38 y.o.  female who I am seeing in f/u for management of breast cancer. She found the right breast mass herself, and had a biopsy on 13 showing IDC, 1 cm, gr 2-3, ER + at 82%, RI neg at < 1%, ki67 62%, her 2 neg at 1+. Had a right mastectomy on 13 showing IDC 2.5 cm, gr 2, no LVI, 2/7 LN involved with no CAROLYN but greatest size 0.8 cm, ER + at 80%, RI negative, HER 2 neg ratio 1.1, ki67 80%. There was also a separate focus of IDC, 2 cm, gr 3, HER 2 negative    S/p DD AC-T q 2 weeks x 4 (60/600 then 175 mg/m2) from 10/28/13-2/3/14    S/p XRT 14    Started tamoxifen 14, been off since 2015    Last eye exam 11/2/15  Last gyn exam:     LMP 9/12/16    2/3/15 reconstruction: Benign scar    Interval history: complains of gr 1 fatigue, gr 1 hot flashes, gr 1 insomnia, gr 1 pain in chest and legs, gr 1 neuropathy, gr 1 swelling, gr 2 headache, gr 1 urgency. She is seeing Dr. Laura Gilliam for fertility. Did have a miscarriage this summer. She is on clomid now, will start injections soon. DX   Encounter Diagnosis   Name Primary?     Malignant neoplasm of female breast, unspecified estrogen receptor status, unspecified laterality, unspecified site of breast ([address] Yes     Past Medical History:   Diagnosis Date    Anxiety     Bowel obstruction ([address]     [facility] 2013    Cancer [facility])     cancer in right breast    Depression     FH: breast cancer     maternal grandmother,  age 37    GERD (gastroesophageal reflux disease)     Glaucoma     Headache     Hypersomnolent     Joint pain     Leg swelling     Muscle weakness     RIGHT ARM R/T SHOULDER PAIN    Other ill-defined conditions([address]99[address]89)     glaucoma, NEUROPATHY    Radiation therapy complication     Rectal fissure 2010    Uterine polyp     Visual disturbance      Past Surgical History:   Procedure Laterality Date    HX APPENDECTOMY      HX BREAST BIOPSY      HX BREAST RECONSTRUCTION  2013    BREAST RECONSTRUCTION performed by Rojelio Fish MD at [address] HX CYST REMOVAL  2010    ganglion cyst RIGHT wrist    HX GYN  2005    uterine polyp removed, leep    HX GYN  1998    LEEP    HX HEENT  2010    eye surgery    HX MASTECTOMY Right     HX MODIFIED RADICAL MASTECTOMY  2013    BREAST MASTECTOMY MODIFIED RADICAL performed by Aram Barber MD at [address] HX OOPHORECTOMY  2008    removal of LEFT ovary and fallopian tube    HX OTHER SURGICAL      ganglion cysts removed wrists    HX OTHER SURGICAL  2010    anal fissure removed.     HX SMALL BOWEL RESECTION  2010    small bowel obstruction      Social History     Social History    Marital status:      Spouse name: N/A    Number of children: N/A    Years of education: N/A     Social History Main Topics    Smoking status: Never Smoker    Smokeless tobacco: Never Used    Alcohol use No    Drug use: No    Sexual activity: Yes     Partners: Male     Birth control/ protection: None     Other Topics Concern    None     Social History Narrative     Family History   Problem Relation Age of Onset    Cancer Maternal Grandmother      breast cancer,  age 37    Breast Cancer Maternal Grandmother     Malignant Hyperthermia Neg Hx     Pseudocholinesterase Deficiency Neg Hx     Delayed Awakening Neg Hx     Post-op Nausea/Vomiting Neg Hx     Emergence Delirium Neg Hx     Post-op Cognitive Dysfunction Neg Hx     Other Neg Hx     Anesth Problems Neg Hx      Current Outpatient Prescriptions   Medication Sig Dispense Refill    clomiPHENE (CLOMID) 50 mg tablet TK 2 TS PO QD FOR 5 DAYS 1    brimonidine-timolol (COMBIGAN) 0.2-0.5 % Drop ophthalmic solution Administer 1 Drop to both eyes two (2) times a day.  brinzolamide (AZOPT) 1 % ophthalmic suspension Administer 1 Drop to both eyes two (2) times a day.  bimatoprost (LUMIGAN) 0.03 % ophthalmic drops Administer 1 Drop to both eyes every evening. Allergies   Allergen Reactions    Morphine Other (comments)     Upset stomach     Review of Systems    A comprehensive review of systems was performed and all systems were negative except for HPI and for the symptom report form, reviewed and scanned in    Objective:  Physical Exam:  Visit Vitals    /66 (BP 1 Location: Left arm, BP Patient Position: Sitting)    Pulse 69    Temp 97.4 °F (36.3 °C) (Oral)    Resp 16    Ht 5' 5\" (1.651 m)    Wt 201 lb 12.8 oz (91.5 kg)    SpO2 99%    BMI 33.58 kg/m2       General:  Alert, cooperative, no distress, appears stated age. Head:  Normocephalic, without obvious abnormality, atraumatic. Eyes:  Conjunctivae/corneas clear. PERRL, EOMs intact. Throat: Lips, mucosa, and tongue normal.    Neck: Supple, symmetrical, trachea midline, no adenopathy, thyroid: no enlargement/tenderness/nodules   Back:   Symmetric, no curvature. ROM normal. No CVA tenderness. Lungs:   Clear to auscultation bilaterally. Chest wall:  No tenderness or deformity. Heart:  Regular rate and rhythm, S1, S2 normal, no murmur, click, rub or gallop. Breast Exam:  S/p right mastectomy with tissue expander in place. Abdomen:   Soft, non-tender. Bowel sounds normal. No masses,  No organomegaly. Extremities: Extremities normal, atraumatic, no cyanosis or edema noted. Skin: Skin color, texture, turgor normal. No rashes or lesions. Lymph nodes: Cervical, supraclavicular, and axillary nodes normal.   Neurologic: CNII-XII intact.        Diagnostic Imaging   10/21/13 TTE: EF 60%    8/24/18 L mammogram:  negative    Lab Results  Lab Results   Component Value Date/Time    WBC 3.6 09/28/2016 09:59 AM    HGB 12.2 09/28/2016 09:59 AM    HCT 38.9 09/28/2016 09:59 AM    PLATELET 461 03/16/8526 09:59 AM    MCV 71.9 (L) 09/28/2016 09:59 AM     Lab Results   Component Value Date/Time    Sodium 140 01/20/2015 03:59 PM    Potassium 3.9 01/20/2015 03:59 PM    Chloride 107 01/20/2015 03:59 PM    CO2 28 01/20/2015 03:59 PM    Anion gap 5 01/20/2015 03:59 PM    Glucose 78 01/20/2015 03:59 PM    BUN 19 01/20/2015 03:59 PM    Creatinine 0.64 01/20/2015 03:59 PM    BUN/Creatinine ratio 30 (H) 01/20/2015 03:59 PM    GFR est AA >60 01/20/2015 03:59 PM    GFR est non-AA >60 01/20/2015 03:59 PM    Calcium 9.1 01/20/2015 03:59 PM    AST (SGOT) 22 01/20/2015 03:59 PM    Alk. phosphatase 54 01/20/2015 03:59 PM    Protein, total 7.3 01/20/2015 03:59 PM    Albumin 4.0 01/20/2015 03:59 PM    Globulin 3.3 01/20/2015 03:59 PM    A-G Ratio 1.2 01/20/2015 03:59 PM    ALT (SGPT) 25 01/20/2015 03:59 PM     Assessment/Plan:  38 y.o. female with multifocal IDC on the right, 2.5 cm and 2 cm, gr 3, ER positive, [place] negative, HER 2 negative, 2/7 LN involved. [id number]. Mx  PS 0   BRCA 1/2 and ELIZABETH neg. 1. Breast cancer stage: IIA    Hormonal therapy: administered    No evidence of recurrence. She has stopped her tamoxifen while attempting to get pregnant. Retrospective studies show that becoming pregnant after breast cancer does not equal worse outcomes, even in the ER+ population. She is seeing a fertility specialist. I discussed that we would want to restart her tamoxifen following delivery, should that occur. I discussed that if she does not take her tamoxifen again, her risk of breast cancer recurrence would nearly double, from about 25% to 49%. She is following with Dr. Romana Mohan and has discussed IVF. We discussed that there in theory is a risk with fertility treatment with breast cancer, but this is in theory, and it is difficult to quantify the risk.  I am happy with the apparent urgency in the situation and have discussed with her the need to eventually restart the tamoxifen. 8/24/18 L mammogram negative, repeat for 08/19 ordered. 2. Hot Flashes: improved, mild and intermittent. 3. Neuropathy: ongoing at night, she has stopped the gabapentin. She takes mobic as needed. 4. Thrombocytopenia: resolved, 172 in 09/2016.    > 25 minutes were spent with this patient with > 50% of that time spent in face to face counseling.     Follow-up Disposition: Not on Kassidy Shoemaker MD

## 2018-10-19 ENCOUNTER — DOCUMENTATION ONLY (OUTPATIENT)
Dept: SURGERY | Age: 43
End: 2018-10-19

## 2018-10-19 NOTE — PROGRESS NOTES
Received a request for Progress Notes to be sent. Routed note to PCP and to the Referral mgmt Office.

## 2019-08-26 ENCOUNTER — HOSPITAL ENCOUNTER (OUTPATIENT)
Dept: MAMMOGRAPHY | Age: 44
Discharge: HOME | End: 2019-08-26
Attending: INTERNAL MEDICINE
Payer: OTHER GOVERNMENT

## 2019-08-26 DIAGNOSIS — Z12.39 BREAST SCREENING, UNSPECIFIED: ICD-10-CM

## 2019-08-26 PROCEDURE — 77063 BREAST TOMOSYNTHESIS BI: CPT

## 2019-10-09 ENCOUNTER — OFFICE VISIT (OUTPATIENT)
Dept: ONCOLOGY | Age: 44
End: 2019-10-09

## 2019-10-09 VITALS
WEIGHT: 216 LBS | SYSTOLIC BLOOD PRESSURE: 125 MMHG | TEMPERATURE: 98 F | BODY MASS INDEX: 35.99 KG/M2 | DIASTOLIC BLOOD PRESSURE: 84 MMHG | OXYGEN SATURATION: 98 % | HEIGHT: 65 IN | RESPIRATION RATE: 18 BRPM | HEART RATE: 69 BPM

## 2019-10-09 DIAGNOSIS — Z17.0 MALIGNANT NEOPLASM OF RIGHT BREAST IN FEMALE, ESTROGEN RECEPTOR POSITIVE, UNSPECIFIED SITE OF BREAST (HCC): Primary | ICD-10-CM

## 2019-10-09 DIAGNOSIS — C50.911 MALIGNANT NEOPLASM OF RIGHT BREAST IN FEMALE, ESTROGEN RECEPTOR POSITIVE, UNSPECIFIED SITE OF BREAST (HCC): Primary | ICD-10-CM

## 2019-10-09 RX ORDER — NETARSUDIL AND LATANOPROST OPHTHALMIC SOLUTION, 0.02%/0.005% .2; .05 MG/ML; MG/ML
SOLUTION/ DROPS OPHTHALMIC; TOPICAL
COMMUNITY
Start: 2019-10-05

## 2019-10-09 NOTE — PROGRESS NOTES
[address], [address] [place] [address] [address]  W: [phone]  F: [phone]     F/u  HEME/ONC CONSULT    Reason for visit: management of  Breast Cancer    Referring physician:  Dr. Tacho Tian physician:  Dr. Barbara Cowart, Dr. Alen Del Cid (fertility)    HPI:   Nani Lanier is a 37 y.o.  female who I am seeing in f/u for management of breast cancer. She found the right breast mass herself, and had a biopsy on 13 showing IDC, 1 cm, gr 2-3, ER + at 82%, SD neg at < 1%, ki67 62%, her 2 neg at 1+. Had a right mastectomy on 13 showing IDC 2.5 cm, gr 2, no LVI, 2/7 LN involved with no CAROLYN but greatest size 0.8 cm, ER + at 80%, SD negative, HER 2 neg ratio 1.1, ki67 80%. There was also a separate focus of IDC, 2 cm, gr 3, HER 2 negative    S/p DD AC-T q 2 weeks x 4 (60/600 then 175 mg/m2) from 10/28/13-2/3/14    S/p XRT 14    Started tamoxifen 14, been off since 2015    Last eye exam 11/2/15  Last gyn exam:     LMP 7/22/19    2/3/15 reconstruction: Benign scar    Interval history: In today for follow up. Complains of gr 1 hot flashes, gr 1 pain, gr 1 neuropathy, gr 1 swelling, gr 1 headache. DX   Encounter Diagnosis   Name Primary?     Malignant neoplasm of right breast in female, estrogen receptor positive, unspecified site of breast ([address] Yes     Past Medical History:   Diagnosis Date    Anxiety     Bowel obstruction ([address] 2010    Breast cancer ([address] 2013    rt mastectomy    Cancer ([address]     cancer in right breast    Depression     FH: breast cancer     maternal grandmother,  age 37    GERD (gastroesophageal reflux disease)     Glaucoma     Headache     Hypersomnolent     Joint pain     Leg swelling     Muscle weakness     RIGHT ARM R/T SHOULDER PAIN    Other ill-defined conditions(799.89)     glaucoma, NEUROPATHY    Radiation therapy complication     radiation and chemo    Rectal fissure     Uterine polyp 2005  Visual disturbance      Past Surgical History:   Procedure Laterality Date    HX APPENDECTOMY  1977    HX BREAST BIOPSY      HX BREAST RECONSTRUCTION  2013    BREAST RECONSTRUCTION performed by Vinicius Rutherford MD at [facility] HX CYST REMOVAL  2010    ganglion cyst RIGHT wrist    HX GYN  2005    uterine polyp removed, leep    HX GYN  1998    LEEP    HX HEENT  2010    eye surgery    HX MASTECTOMY Right     HX MODIFIED RADICAL MASTECTOMY  2013    BREAST MASTECTOMY MODIFIED RADICAL performed by Lyudmila East MD at [facility] HX OOPHORECTOMY      removal of LEFT ovary and fallopian tube    HX OTHER SURGICAL      ganglion cysts removed wrists    HX OTHER SURGICAL  2010    anal fissure removed.     HX SMALL BOWEL RESECTION  2010    small bowel obstruction      Social History     Socioeconomic History    Marital status:      Spouse name: Not on file    Number of children: Not on file    Years of education: Not on file    Highest education level: Not on file   Tobacco Use    Smoking status: Never Smoker    Smokeless tobacco: Never Used   Substance and Sexual Activity    Alcohol use: No    Drug use: No    Sexual activity: Yes     Partners: Male     Birth control/protection: None     Family History   Problem Relation Age of Onset    Cancer Maternal Grandmother         breast cancer,  age 37   [facility] Breast Cancer Maternal Grandmother     Malignant Hyperthermia Neg Hx     Pseudocholinesterase Deficiency Neg Hx     Delayed Awakening Neg Hx     Post-op Nausea/Vomiting Neg Hx     Emergence Delirium Neg Hx     Post-op Cognitive Dysfunction Neg Hx     Other Neg Hx     Anesth Problems Neg Hx      Current Outpatient Medications   Medication Sig Dispense Refill    ROCKLATAN 0.02-0.005 % drop       clomiPHENE (CLOMID) 50 mg tablet TK 2 TS PO QD FOR 5 DAYS  1    brimonidine-timolol (COMBIGAN) 0.2-0.5 % Drop ophthalmic solution Administer 1 Drop to both eyes two (2) times a day.  brinzolamide (AZOPT) 1 % ophthalmic suspension Administer 1 Drop to both eyes two (2) times a day.  bimatoprost (LUMIGAN) 0.03 % ophthalmic drops Administer 1 Drop to both eyes every evening. Allergies   Allergen Reactions    Morphine Other (comments)     Upset stomach     Review of Systems    A comprehensive review of systems was performed and all systems were negative except for HPI and for the symptom report form, reviewed and scanned in    Objective:  Physical Exam:  Visit Vitals  /84   Pulse 69   Temp 98 °F (36.7 °C) (Temporal)   Resp 18   Ht 5' 5\" (1.651 m)   Wt 216 lb (98 kg)   SpO2 98%   BMI 35.94 kg/m²       General:  Alert, cooperative, no distress, appears stated age. Head:  Normocephalic, without obvious abnormality, atraumatic. Eyes:  Conjunctivae/corneas clear. PERRL, EOMs intact. Throat: Lips, mucosa, and tongue normal.    Neck: Supple, symmetrical, trachea midline, no adenopathy, thyroid: no enlargement/tenderness/nodules   Back:   Symmetric, no curvature. ROM normal. No CVA tenderness. Lungs:   Clear to auscultation bilaterally. Chest wall:  No tenderness or deformity. Heart:  Regular rate and rhythm, S1, S2 normal, no murmur, click, rub or gallop. Breast Exam:  S/p right mastectomy with tissue expander in place. Abdomen:   Soft, non-tender. Bowel sounds normal. No masses,  No organomegaly. Extremities: Extremities normal, atraumatic, no cyanosis or edema noted. Skin: Skin color, texture, turgor normal. No rashes or lesions. Lymph nodes: Cervical, supraclavicular, and axillary nodes normal.   Neurologic: CNII-XII intact.        Diagnostic Imaging   10/21/13 TTE: EF 60%    8/26/19 L mammogram:  negative    Lab Results  Lab Results   Component Value Date/Time    WBC 3.6 09/28/2016 09:59 AM    HGB 12.2 09/28/2016 09:59 AM    HCT 38.9 09/28/2016 09:59 AM    PLATELET 544 25/99/1335 09:59 AM    MCV 71.9 (L) 09/28/2016 09:59 AM Lab Results   Component Value Date/Time    Sodium 140 01/20/2015 03:59 PM    Potassium 3.9 01/20/2015 03:59 PM    Chloride 107 01/20/2015 03:59 PM    CO2 28 01/20/2015 03:59 PM    Anion gap 5 01/20/2015 03:59 PM    Glucose 78 01/20/2015 03:59 PM    BUN 19 01/20/2015 03:59 PM    Creatinine 0.64 01/20/2015 03:59 PM    BUN/Creatinine ratio 30 (H) 01/20/2015 03:59 PM    GFR est AA >60 01/20/2015 03:59 PM    GFR est non-AA >60 01/20/2015 03:59 PM    Calcium 9.1 01/20/2015 03:59 PM    AST (SGOT) 22 01/20/2015 03:59 PM    Alk. phosphatase 54 01/20/2015 03:59 PM    Protein, total 7.3 01/20/2015 03:59 PM    Albumin 4.0 01/20/2015 03:59 PM    Globulin 3.3 01/20/2015 03:59 PM    A-G Ratio 1.2 01/20/2015 03:59 PM    ALT (SGPT) 25 01/20/2015 03:59 PM     Assessment/Plan:  37 y.o. female with multifocal IDC on the right, 2.5 cm and 2 cm, gr 3, ER positive, [place] negative, HER 2 negative, 2/7 LN involved. [id number]. Mx  PS 0   BRCA 1/2 and ELIZABETH neg. 1. Breast cancer stage: IIA    Hormonal therapy: administered    No evidence of recurrence. She has stopped her tamoxifen while attempting to get pregnant. Retrospective studies show that becoming pregnant after breast cancer does not equal worse outcomes, even in the ER+ population. She is seeing a fertility specialist. I discussed that we would want to restart her tamoxifen following delivery, should that occur. She is going [facility]. Still following with Dr. Trevon Dey    I discussed that if she does not take her tamoxifen again, her risk of breast cancer recurrence would nearly double, from about 25% to 49%. We discussed that there in theory is a risk with fertility treatment with breast cancer, but this is in theory, and it is difficult to quantify the risk. I am happy with the apparent urgency in the situation and have discussed with her the need to eventually restart the tamoxifen. 8/26/19 L mammogram negative, repeat for 08/20 ordered. 2. Hot Flashes: improved, mild and intermittent. 3. Neuropathy: ongoing at night, she has stopped the gabapentin. She takes mobic as needed. > 15 minutes were spent with this patient with > 50% of that time spent in face to face counseling.         Rich Dos Santos MD

## 2019-10-14 ENCOUNTER — TELEPHONE (OUTPATIENT)
Dept: INFUSION THERAPY | Age: 44
End: 2019-10-14

## 2019-10-14 DIAGNOSIS — Z12.31 SCREENING MAMMOGRAM, ENCOUNTER FOR: Primary | ICD-10-CM

## 2019-10-14 NOTE — TELEPHONE ENCOUNTER
Called patient. Discussed with her regarding her concerns about mammogram.  Notified her she does not need a mammo in 11/2019, next screening mammo is due 8/2020. Also notified patient that new order will be place and schedulers will be contacting her to schedule her mammo due 8/2020. Patient denies any further questions at this time.

## 2019-10-14 NOTE — TELEPHONE ENCOUNTER
Patient called and requested a call back as soon as possible. She stated that she spoke with Dr. Cristy Blackmon last week about her mammogram being \"good\" she informed me that she got a phone call today saying she needs to schedule a diagnostic mommogram. She would like to speak with someone in regards to this appointment and why it needs to be schedule.     Call back [phone]

## 2019-10-31 ENCOUNTER — DOCUMENTATION ONLY (OUTPATIENT)
Dept: SURGERY | Age: 44
End: 2019-10-31

## 2019-10-31 NOTE — PROGRESS NOTES
Received a faxed subpoena from [facility] for medical records on 10/30/19. Request was faxed to [facility] on 10/30/19 @ [phone].

## 2020-03-10 ENCOUNTER — TELEPHONE (OUTPATIENT)
Dept: ONCOLOGY | Age: 45
End: 2020-03-10

## 2020-03-10 NOTE — TELEPHONE ENCOUNTER
Juliet Mazariegos I just got over a consult for Ms. Celiaama Foil like are looking for a letter from us because she would like to get pregnant. I sent it to Dr. Jacey Dinh already I just wanted to let you two know.    Thanks

## 2020-03-11 ENCOUNTER — TELEPHONE (OUTPATIENT)
Dept: ONCOLOGY | Age: 45
End: 2020-03-11

## 2020-03-11 NOTE — TELEPHONE ENCOUNTER
3/11/2020 3:18 PM: Bruna Majano and spoke to Dr. Chely Mendez nurse Vidal Saleh. Lennard Aschoff and Dr. Oksana Villagomez does not want to write a letter giving medical clearance for the patient to get pregnant at this time. Advised that RN will fax Dr. Bella Landaverde last office note to them and also provided Dr. Bella Landaverde direct number for Dr. Moses Gutiérrez to call and discuss. Vidal Saleh did not have any questions or concerns at this time.

## 2020-08-26 ENCOUNTER — TELEPHONE (OUTPATIENT)
Dept: ONCOLOGY | Age: 45
End: 2020-08-26

## 2020-08-26 NOTE — TELEPHONE ENCOUNTER
I called and spoke with the Patient and let her know that it was just an accidental ordering and that nothing was wrong with her per [facility] and that she can change the other to a Screening if she wants her to. She declined to have it changed and stated that she has an Appointment already on Friday and has already taken off for it and was fine with it, just wanted to know what was going on. She had no further question's at that time.

## 2020-08-26 NOTE — TELEPHONE ENCOUNTER
Patient called and stated she would like to know why her mammogram is not a regular one.    #[phone]

## 2020-08-28 ENCOUNTER — HOSPITAL ENCOUNTER (OUTPATIENT)
Dept: MAMMOGRAPHY | Age: 45
Discharge: HOME | End: 2020-08-28
Attending: INTERNAL MEDICINE
Payer: OTHER GOVERNMENT

## 2020-08-28 DIAGNOSIS — Z17.0 MALIGNANT NEOPLASM OF RIGHT BREAST IN FEMALE, ESTROGEN RECEPTOR POSITIVE, UNSPECIFIED SITE OF BREAST (HCC): ICD-10-CM

## 2020-08-28 DIAGNOSIS — C50.911 MALIGNANT NEOPLASM OF RIGHT BREAST IN FEMALE, ESTROGEN RECEPTOR POSITIVE, UNSPECIFIED SITE OF BREAST (HCC): ICD-10-CM

## 2020-08-28 PROCEDURE — 77063 BREAST TOMOSYNTHESIS BI: CPT

## 2020-10-07 ENCOUNTER — TELEPHONE (OUTPATIENT)
Dept: ONCOLOGY | Age: 45
End: 2020-10-07

## 2020-10-07 NOTE — TELEPHONE ENCOUNTER
10/7/2020 10:07 AM: Called patient, who stated that she did not restart tamoxifen and is still in the process of trying to get pregnant. Advised patient that Marck Valverde NP, would like to reschedule her 10/12 office appointment to a virtual visit six months from now since the patient has not restarted tamoxifen. Patient was agreeable to this; call transferred to [facility] to reschedule.

## 2021-04-14 ENCOUNTER — VIRTUAL VISIT (OUTPATIENT)
Dept: ONCOLOGY | Age: 46
End: 2021-04-14
Payer: OTHER GOVERNMENT

## 2021-04-14 DIAGNOSIS — Z85.3 HISTORY OF BREAST CANCER: Primary | ICD-10-CM

## 2021-04-14 PROCEDURE — 99212 OFFICE O/P EST SF 10 MIN: CPT | Performed by: INTERNAL MEDICINE

## 2021-04-14 NOTE — PROGRESS NOTES
[address]  [address] [facility], [address] [place] [address]  [address]  W: [phone]  F: [phone]     F/u  HEME/ONC CONSULT      Reason for Visit:   Luis A Bowen is a 50 y.o. female who is seen by synchronous (real-time) audio-video technology for follow up of breast cancer    Reason for visit: management of  Breast Cancer    Referring physician:  Dr. Ayala Graft physician:  Dr. Annie Anderson, Dr. Serjio Winters (fertility)    HPI:   Luis A Bowen is a 50 y.o.  female who I am seeing in f/u for management of breast cancer. She found the right breast mass herself, and had a biopsy on 13 showing IDC, 1 cm, gr 2-3, ER + at 82%, IA neg at < 1%, ki67 62%, her 2 neg at 1+. Had a right mastectomy on 13 showing IDC 2.5 cm, gr 2, no LVI, 2/7 LN involved with no CAROLYN but greatest size 0.8 cm, ER + at 80%, IA negative, HER 2 neg ratio 1.1, ki67 80%. There was also a separate focus of IDC, 2 cm, gr 3, HER 2 negative    S/p DD AC-T q 2 weeks x 4 (60/600 then 175 mg/m2) from 10/28/13-2/3/14    S/p XRT 14    Started tamoxifen 14, been off since 2015      2/3/15 reconstruction: Benign scar    Interval history:she is feeling well. Planning to undergo donor egg implant in May. DX   Encounter Diagnosis   Name Primary?     History of breast cancer Yes     Past Medical History:   Diagnosis Date    Anxiety     Bowel obstruction ([address] 2010    Breast cancer ([address] 2013    rt mastectomy    Cancer ([address]     cancer in right breast    Depression     FH: breast cancer     maternal grandmother,  age 37    GERD (gastroesophageal reflux disease)     Glaucoma     Headache     Hypersomnolent     Joint pain     Leg swelling     Muscle weakness     RIGHT ARM R/T SHOULDER PAIN    Other ill-defined conditions(799.89)     glaucoma, NEUROPATHY    Radiation therapy complication     radiation and chemo    Rectal fissure 2010    Uterine polyp 2005    Visual disturbance      Past Surgical History:   Procedure Laterality Date    HX APPENDECTOMY      HX BREAST BIOPSY      HX BREAST RECONSTRUCTION  2013    BREAST RECONSTRUCTION performed by Ezekiel Prasad MD at [address] HX CYST REMOVAL  2010    ganglion cyst RIGHT wrist    HX GYN  2005    uterine polyp removed, leep    HX GYN  1998    LEEP    HX HEENT  2010    eye surgery    HX MASTECTOMY Right     HX MODIFIED RADICAL MASTECTOMY  2013    BREAST MASTECTOMY MODIFIED RADICAL performed by Rhiannon Jasso MD at [address] HX OOPHORECTOMY  2008    removal of LEFT ovary and fallopian tube    HX OTHER SURGICAL      ganglion cysts removed wrists    HX OTHER SURGICAL  2010    anal fissure removed.     HX SMALL BOWEL RESECTION  2010    small bowel obstruction      Social History     Socioeconomic History    Marital status:      Spouse name: Not on file    Number of children: Not on file    Years of education: Not on file    Highest education level: Not on file   Tobacco Use    Smoking status: Never Smoker    Smokeless tobacco: Never Used   Substance and Sexual Activity    Alcohol use: No    Drug use: No    Sexual activity: Yes     Partners: Male     Birth control/protection: None     Family History   Problem Relation Age of Onset    Cancer Maternal Grandmother         breast cancer,  age 37    Breast Cancer Maternal Grandmother         late 42's    Malignant Hyperthermia Neg Hx     Pseudocholinesterase Deficiency Neg Hx     Delayed Awakening Neg Hx     Post-op Nausea/Vomiting Neg Hx     Emergence Delirium Neg Hx     Post-op Cognitive Dysfunction Neg Hx     Other Neg Hx     Anesth Problems Neg Hx      Current Outpatient Medications   Medication Sig Dispense Refill    ROCKLATAN 0.02-0.005 % drop       clomiPHENE (CLOMID) 50 mg tablet TK 2 TS PO QD FOR 5 DAYS  1    brimonidine-timolol (COMBIGAN) 0.2-0.5 % Drop ophthalmic solution Administer 1 Drop to both eyes two (2) times a day.  brinzolamide (AZOPT) 1 % ophthalmic suspension Administer 1 Drop to both eyes two (2) times a day.  bimatoprost (LUMIGAN) 0.03 % ophthalmic drops Administer 1 Drop to both eyes every evening. Allergies   Allergen Reactions    Morphine Other (comments)     Upset stomach     Review of Systems    A comprehensive review of systems was performed and all systems were negative except for HPI     Objective:  Physical Exam:  There were no vitals taken for this visit. Constitutional: Appears well-developed and well-nourished in no apparent distress      Mental status: Alert and awake, Oriented to person/place/time, Able to follow commands    Eyes: EOM normal, Sclera normal, No visible ocular discharge    HENT: Normocephalic, atraumatic    Mouth/Throat: Moist mucous membranes    External Ears normal    Neck: No visualized mass    Pulmonary/Chest: Respiratory effort normal, No visualized signs of difficulty breathing or respiratory distress      Neurological: No facial asymmetry (Cranial nerve 7 motor function), No gaze palsy    Skin: No significant exanthematous lesions or discoloration noted on facial skin    Psychiatric: Normal affect, No hallucinations     Due to this being a TeleHealth evaluation, many elements of the physical examination are unable to be assessed.                                                                       Diagnostic Imaging   10/21/13 TTE: EF 60%    8/26/19 L mammogram:  negative    Lab Results  Lab Results   Component Value Date/Time    WBC 3.6 09/28/2016 09:59 AM    HGB 12.2 09/28/2016 09:59 AM    HCT 38.9 09/28/2016 09:59 AM    PLATELET 060 83/39/1916 09:59 AM    MCV 71.9 (L) 09/28/2016 09:59 AM     Lab Results   Component Value Date/Time    Sodium 140 01/20/2015 03:59 PM    Potassium 3.9 01/20/2015 03:59 PM    Chloride 107 01/20/2015 03:59 PM    CO2 28 01/20/2015 03:59 PM    Anion gap 5 01/20/2015 03:59 PM    Glucose 78 01/20/2015 03:59 PM    BUN 19 01/20/2015 03:59 PM    Creatinine 0.64 01/20/2015 03:59 PM    BUN/Creatinine ratio 30 (H) 01/20/2015 03:59 PM    GFR est AA >60 01/20/2015 03:59 PM    GFR est non-AA >60 01/20/2015 03:59 PM    Calcium 9.1 01/20/2015 03:59 PM    Alk. phosphatase 54 01/20/2015 03:59 PM    Protein, total 7.3 01/20/2015 03:59 PM    Albumin 4.0 01/20/2015 03:59 PM    Globulin 3.3 01/20/2015 03:59 PM    A-G Ratio 1.2 01/20/2015 03:59 PM    ALT (SGPT) 25 01/20/2015 03:59 PM     Assessment/Plan:  50 y.o. female with multifocal IDC on the right, 2.5 cm and 2 cm, gr 3, ER positive, CT negative, HER 2 negative, 2/7 LN involved. [id number]. Mx  PS 0   BRCA 1/2 and ELIZABETH neg. 1. Breast cancer stage: IIA    Hormonal therapy: administered    No evidence of recurrence. She has stopped her tamoxifen while attempting to get pregnant. Retrospective studies show that becoming pregnant after breast cancer does not equal worse outcomes, even in the ER+ population. She is seeing a fertility specialist. I discussed that we would want to restart her tamoxifen following delivery, should that occur. She is planning donor egg implantation in May. I asked her to keep us posted    I discussed that if she does not take her tamoxifen again, her risk of breast cancer recurrence would nearly double, from about 25% to 49%. We discussed that there in theory is a risk with fertility treatment with breast cancer, but this is in theory, and it is difficult to quantify the risk. I am happy with the apparent urgency in the situation and have discussed with her the need to eventually restart the tamoxifen, following delivery    8/28/20 L mammogram negative, repeat 2021 timing will depend on pregnancy status      The patient was evaluated through a synchronous (real-time) audio-video encounter. The patient (or guardian if applicable) is aware that this is a billable service. Verbal consent to proceed has been obtained within the past 12 months.  The visit was conducted pursuant to the emergency declaration under the [address], [address] [place] [address] authority and the [employer] Act. Patient identification was verified, and a caregiver was present when appropriate. The patient was located in a state where the provider was credentialed to provide care.         Douglas Roblero MD

## 2021-08-31 ENCOUNTER — TRANSCRIBE ORDER (OUTPATIENT)
Dept: SCHEDULING | Age: 46
End: 2021-08-31

## 2021-08-31 DIAGNOSIS — Z12.31 SCREENING MAMMOGRAM FOR HIGH-RISK PATIENT: Primary | ICD-10-CM

## 2021-11-10 ENCOUNTER — HOSPITAL ENCOUNTER (OUTPATIENT)
Dept: MAMMOGRAPHY | Age: 46
Discharge: HOME | End: 2021-11-10
Attending: OBSTETRICS & GYNECOLOGY
Payer: OTHER GOVERNMENT

## 2021-11-10 DIAGNOSIS — Z12.31 SCREENING MAMMOGRAM FOR HIGH-RISK PATIENT: ICD-10-CM

## 2021-11-10 PROCEDURE — 77063 BREAST TOMOSYNTHESIS BI: CPT

## 2022-06-28 ENCOUNTER — OFFICE VISIT (OUTPATIENT)
Dept: ONCOLOGY | Age: 47
End: 2022-06-28
Payer: OTHER GOVERNMENT

## 2022-06-28 VITALS
HEART RATE: 74 BPM | OXYGEN SATURATION: 96 % | HEIGHT: 64 IN | BODY MASS INDEX: 35.89 KG/M2 | DIASTOLIC BLOOD PRESSURE: 80 MMHG | TEMPERATURE: 98 F | SYSTOLIC BLOOD PRESSURE: 137 MMHG | WEIGHT: 210.2 LBS | RESPIRATION RATE: 18 BRPM

## 2022-06-28 DIAGNOSIS — Z85.3 HISTORY OF BREAST CANCER: Primary | ICD-10-CM

## 2022-06-28 PROCEDURE — 99212 OFFICE O/P EST SF 10 MIN: CPT | Performed by: INTERNAL MEDICINE

## 2022-06-28 NOTE — PROGRESS NOTES
[address]  301 [facility], [address] [place] [address]  [address]  W: [phone]  F: [phone]       Reason for Visit:   Maurisio Phelan is a 48 y.o. female who is seen for follow up of breast cancer    Reason for visit: management of  Breast Cancer    Referring physician:  Dr. Shaniqua Hernandez physician:  Dr. Joanne León, Dr. Katelynn Taylor (fertility)    HPI:   Maurisio Phelan is a 48 y.o.  female who I am seeing in f/u for management of breast cancer. She found the right breast mass herself, and had a biopsy on 13 showing IDC, 1 cm, gr 2-3, ER + at 82%, IA neg at < 1%, ki67 62%, her 2 neg at 1+. Had a right mastectomy on 13 showing IDC 2.5 cm, gr 2, no LVI, 2/7 LN involved with no CAROLYN but greatest size 0.8 cm, ER + at 80%, IA negative, HER 2 neg ratio 1.1, ki67 80%. There was also a separate focus of IDC, 2 cm, gr 3, HER 2 negative    S/p DD AC-T q 2 weeks x 4 (60/600 then 175 mg/m2) from 10/28/13-2/3/14    S/p XRT 14    Started tamoxifen 14, been off since 2015    2/3/15 reconstruction: Benign scar    Interval history: Patient presents today for follow up. Reports gr 2 hot flashes, gr 1 insomnia, gr 1 pain rated 4/10 to right breast, gr neuropathy, gr 1 headaches. She is scheduled for implantation for IVF in September in [place]. No worsening to pain, she states this is her baseline. DX   Encounter Diagnosis   Name Primary?     History of breast cancer Yes     Past Medical History:   Diagnosis Date    Anxiety     Bowel obstruction ([address]     Breast cancer ([address] 2013    rt mastectomy    Cancer ([address]     cancer in right breast    Depression     FH: breast cancer     maternal grandmother,  age 37    GERD (gastroesophageal reflux disease)     Glaucoma     Headache     Hypersomnolent     Joint pain     Leg swelling     Muscle weakness     RIGHT ARM R/T SHOULDER PAIN    Other ill-defined conditions(799.89)     glaucoma, NEUROPATHY    Radiation therapy complication     radiation and chemo    Rectal fissure 2010    Uterine polyp 2005    Visual disturbance      Past Surgical History:   Procedure Laterality Date    HX APPENDECTOMY  1977    HX BREAST BIOPSY      HX BREAST RECONSTRUCTION  2013    BREAST RECONSTRUCTION performed by Maia Garces MD at [address] HX CYST REMOVAL  2010    ganglion cyst RIGHT wrist    HX GYN  2005    uterine polyp removed, leep    HX GYN  1998    LEEP    HX HEENT  2010    eye surgery    HX MASTECTOMY Right     HX MODIFIED RADICAL MASTECTOMY  2013    BREAST MASTECTOMY MODIFIED RADICAL performed by Tressa Nam MD at [address] HX OOPHORECTOMY  2008    removal of LEFT ovary and fallopian tube    HX OTHER SURGICAL      ganglion cysts removed wrists    HX OTHER SURGICAL  2010    anal fissure removed.  HX SMALL BOWEL RESECTION  2010    small bowel obstruction      Social History     Socioeconomic History    Marital status:    Tobacco Use    Smoking status: Never Smoker    Smokeless tobacco: Never Used   Substance and Sexual Activity    Alcohol use: No    Drug use: No    Sexual activity: Yes     Partners: Male     Birth control/protection: None     Family History   Problem Relation Age of Onset    Cancer Maternal Grandmother         breast cancer,  age 37    Breast Cancer Maternal Grandmother         late 42's    Malignant Hyperthermia Neg Hx     Pseudocholinesterase Deficiency Neg Hx     Delayed Awakening Neg Hx     Post-op Nausea/Vomiting Neg Hx     Emergence Delirium Neg Hx     Post-op Cognitive Dysfunction Neg Hx     Other Neg Hx     Anesth Problems Neg Hx      Current Outpatient Medications   Medication Sig Dispense Refill    ROCKLATAN 0.02-0.005 % drop       brimonidine-timolol (COMBIGAN) 0.2-0.5 % Drop ophthalmic solution Administer 1 Drop to both eyes two (2) times a day.       brinzolamide (AZOPT) 1 % ophthalmic suspension Administer 1 Drop to both eyes two (2) times a day.  bimatoprost (LUMIGAN) 0.03 % ophthalmic drops Administer 1 Drop to both eyes every evening.  clomiPHENE (CLOMID) 50 mg tablet TK 2 TS PO QD FOR 5 DAYS (Patient not taking: Reported on 6/28/2022)  1     Allergies   Allergen Reactions    Morphine Other (comments)     Upset stomach     Review of Systems    A comprehensive review of systems was performed and all systems were negative except for HPI     Objective:  Physical Exam:  Visit Vitals  /80   Pulse 74   Temp 98 °F (36.7 °C) (Temporal)   Resp 18   Ht 5' 4\" (1.626 m)   Wt 210 lb 3.2 oz (95.3 kg)   SpO2 96%   BMI 36.08 kg/m²     ECOG PS: 0  General: No distress  Eyes: PERRL, anicteric sclerae  HENT: Atraumatic, OP clear  Neck: Supple  Respiratory: normal respiratory effort  CV:  no peripheral edema  MS: Normal gait and station. Digits without clubbing or cyanosis. Skin: No rashes, ecchymoses, or petechiae. Normal temperature, turgor, and texture. Psych: Alert, oriented, appropriate affect, normal judgment/insight      Diagnostic Imaging   10/21/13 TTE: EF 60%    8/26/19 L mammogram:  negative    Lab Results  Lab Results   Component Value Date/Time    WBC 3.6 09/28/2016 09:59 AM    HGB 12.2 09/28/2016 09:59 AM    HCT 38.9 09/28/2016 09:59 AM    PLATELET 337 45/53/6096 09:59 AM    MCV 71.9 (L) 09/28/2016 09:59 AM     Lab Results   Component Value Date/Time    Sodium 140 01/20/2015 03:59 PM    Potassium 3.9 01/20/2015 03:59 PM    Chloride 107 01/20/2015 03:59 PM    CO2 28 01/20/2015 03:59 PM    Anion gap 5 01/20/2015 03:59 PM    Glucose 78 01/20/2015 03:59 PM    BUN 19 01/20/2015 03:59 PM    Creatinine 0.64 01/20/2015 03:59 PM    BUN/Creatinine ratio 30 (H) 01/20/2015 03:59 PM    GFR est AA >60 01/20/2015 03:59 PM    GFR est non-AA >60 01/20/2015 03:59 PM    Calcium 9.1 01/20/2015 03:59 PM    Alk.  phosphatase 54 01/20/2015 03:59 PM    Protein, total 7.3 01/20/2015 03:59 PM    Albumin 4.0 01/20/2015 03:59 PM    Globulin 3.3 01/20/2015 03:59 PM    A-G Ratio 1.2 01/20/2015 03:59 PM    ALT (SGPT) 25 01/20/2015 03:59 PM     Assessment/Plan:  48 y.o. female with multifocal IDC on the right, 2.5 cm and 2 cm, gr 3, ER positive, MT negative, HER 2 negative, 2/7 LN involved. [id number]. Mx  PS 0   BRCA 1/2 and ELIZABETH neg. 1. Breast cancer stage: IIA    Hormonal therapy: administered    No evidence of recurrence. She has stopped her tamoxifen while attempting to get pregnant. Retrospective studies show that becoming pregnant after breast cancer does not equal worse outcomes, even in the ER+ population. She is seeing a fertility specialist. I discussed that we would want to restart her tamoxifen following delivery, should that occur. She is planning donor egg implantation in September. She is planning to have multiple children so uncertain when she would be willing to restart tamoxifen. I discussed that if she does not take her tamoxifen again, her risk of breast cancer recurrence would nearly double, from about 25% to 49%. We discussed that there in theory is a risk with fertility treatment with breast cancer, but this is in theory, and it is difficult to quantify the risk. She has been trying for pregnancy for many years and wishes for multiple pregnancies. At this time I recommend she call us and follow up if she is willing to re-start tamoxifen. 11/2021 L mammogram negative, repeat due 11/2022. Discussed recommendation to continue this annually unless contraindicated by pregnancy    I personally saw and evaluated the patient and performed the entire medical decision making. The history, physical exam, and documentation were performed by Ari Roth NP. I reviewed and verified the above documentation and modified it as needed. Right breast cancer ER +. Has not been on tamoxifen for years as she has been trying to get pregnant.   She will follow up with us prn and call us if and when she is ready to discuss tamoxifen    Brad Trujillo MD

## 2022-06-28 NOTE — PROGRESS NOTES
Cynthia Kuo is a 48 y.o. female Follow up for the evaluation of breast cancer. 1. Have you been to the ER, urgent care clinic since your last visit? Hospitalized since your last visit? No    2. Have you seen or consulted any other health care providers outside of the [address] [place] [address] since your last visit? Include any pap smears or colon screening.  No

## 2022-10-06 ENCOUNTER — TRANSCRIBE ORDER (OUTPATIENT)
Dept: SCHEDULING | Age: 47
End: 2022-10-06

## 2022-10-06 DIAGNOSIS — M54.16 LUMBAR RADICULOPATHY: Primary | ICD-10-CM

## 2022-11-14 ENCOUNTER — TRANSCRIBE ORDER (OUTPATIENT)
Dept: SCHEDULING | Age: 47
End: 2022-11-14

## 2022-11-14 DIAGNOSIS — Z12.31 VISIT FOR SCREENING MAMMOGRAM: Primary | ICD-10-CM

## 2022-11-18 ENCOUNTER — HOSPITAL ENCOUNTER (OUTPATIENT)
Dept: MAMMOGRAPHY | Age: 47
Discharge: HOME | End: 2022-11-18
Attending: INTERNAL MEDICINE
Payer: OTHER GOVERNMENT

## 2022-11-18 DIAGNOSIS — Z12.31 VISIT FOR SCREENING MAMMOGRAM: ICD-10-CM

## 2022-11-18 PROCEDURE — 77063 BREAST TOMOSYNTHESIS BI: CPT

## 2023-04-21 DIAGNOSIS — M54.16 LUMBAR RADICULOPATHY: Primary | ICD-10-CM

## 2023-05-26 RX ORDER — BIMATOPROST 0.3 MG/ML
1 SOLUTION/ DROPS OPHTHALMIC EVERY EVENING
COMMUNITY

## 2023-05-26 RX ORDER — NETARSUDIL AND LATANOPROST OPHTHALMIC SOLUTION, 0.02%/0.005% .2; .05 MG/ML; MG/ML
SOLUTION/ DROPS OPHTHALMIC; TOPICAL
COMMUNITY
Start: 2019-10-05

## 2023-05-26 RX ORDER — BRINZOLAMIDE 10 MG/ML
1 SUSPENSION/ DROPS OPHTHALMIC 2 TIMES DAILY
COMMUNITY

## 2023-05-26 RX ORDER — BRIMONIDINE TARTRATE AND TIMOLOL MALEATE 2; 5 MG/ML; MG/ML
1 SOLUTION OPHTHALMIC 2 TIMES DAILY
COMMUNITY

## 2023-12-16 ENCOUNTER — APPOINTMENT (OUTPATIENT)
Facility: HOSPITAL | Age: 48
DRG: 445 | End: 2023-12-16
Payer: OTHER GOVERNMENT

## 2023-12-16 ENCOUNTER — HOSPITAL ENCOUNTER (INPATIENT)
Facility: HOSPITAL | Age: 48
LOS: 7 days | Discharge: HOME HEALTH | DRG: 445 | End: 2023-12-23
Attending: EMERGENCY MEDICINE | Admitting: FAMILY MEDICINE
Payer: OTHER GOVERNMENT

## 2023-12-16 DIAGNOSIS — K81.0 ACUTE CHOLECYSTITIS: Primary | ICD-10-CM

## 2023-12-16 LAB
ALBUMIN SERPL-MCNC: 4 G/DL (ref 3.5–5)
ALBUMIN/GLOB SERPL: 1 (ref 1.1–2.2)
ALP SERPL-CCNC: 119 U/L (ref 45–117)
ALT SERPL-CCNC: 39 U/L (ref 12–78)
ANION GAP SERPL CALC-SCNC: 6 MMOL/L (ref 5–15)
APPEARANCE UR: CLEAR
AST SERPL-CCNC: 27 U/L (ref 15–37)
BACTERIA URNS QL MICRO: NEGATIVE /HPF
BASOPHILS # BLD: 0 K/UL (ref 0–0.1)
BASOPHILS NFR BLD: 0 % (ref 0–1)
BILIRUB SERPL-MCNC: 0.6 MG/DL (ref 0.2–1)
BILIRUB UR QL: NEGATIVE
BUN SERPL-MCNC: 16 MG/DL (ref 6–20)
BUN/CREAT SERPL: 19 (ref 12–20)
CALCIUM SERPL-MCNC: 9.6 MG/DL (ref 8.5–10.1)
CHLORIDE SERPL-SCNC: 104 MMOL/L (ref 97–108)
CO2 SERPL-SCNC: 24 MMOL/L (ref 21–32)
COLOR UR: ABNORMAL
COMMENT:: NORMAL
CREAT SERPL-MCNC: 0.83 MG/DL (ref 0.55–1.02)
DIFFERENTIAL METHOD BLD: ABNORMAL
EOSINOPHIL # BLD: 0.1 K/UL (ref 0–0.4)
EOSINOPHIL NFR BLD: 1 % (ref 0–7)
EPITH CASTS URNS QL MICRO: ABNORMAL /LPF
ERYTHROCYTE [DISTWIDTH] IN BLOOD BY AUTOMATED COUNT: 15.7 % (ref 11.5–14.5)
GLOBULIN SER CALC-MCNC: 3.9 G/DL (ref 2–4)
GLUCOSE SERPL-MCNC: 119 MG/DL (ref 65–100)
GLUCOSE UR STRIP.AUTO-MCNC: NEGATIVE MG/DL
HCG UR QL: NEGATIVE
HCT VFR BLD AUTO: 35.6 % (ref 35–47)
HGB BLD-MCNC: 11.5 G/DL (ref 11.5–16)
HGB UR QL STRIP: NEGATIVE
HYALINE CASTS URNS QL MICRO: ABNORMAL /LPF (ref 0–2)
IMM GRANULOCYTES # BLD AUTO: 0 K/UL (ref 0–0.04)
IMM GRANULOCYTES NFR BLD AUTO: 0 % (ref 0–0.5)
KETONES UR QL STRIP.AUTO: NEGATIVE MG/DL
LACTATE BLD-SCNC: 0.78 MMOL/L (ref 0.4–2)
LEUKOCYTE ESTERASE UR QL STRIP.AUTO: ABNORMAL
LIPASE SERPL-CCNC: 108 U/L (ref 13–75)
LYMPHOCYTES # BLD: 1.7 K/UL (ref 0.8–3.5)
LYMPHOCYTES NFR BLD: 25 % (ref 12–49)
MCH RBC QN AUTO: 23 PG (ref 26–34)
MCHC RBC AUTO-ENTMCNC: 32.3 G/DL (ref 30–36.5)
MCV RBC AUTO: 71.3 FL (ref 80–99)
MONOCYTES # BLD: 0.4 K/UL (ref 0–1)
MONOCYTES NFR BLD: 5 % (ref 5–13)
NEUTS SEG # BLD: 4.6 K/UL (ref 1.8–8)
NEUTS SEG NFR BLD: 68 % (ref 32–75)
NITRITE UR QL STRIP.AUTO: NEGATIVE
NRBC # BLD: 0 K/UL (ref 0–0.01)
NRBC BLD-RTO: 0 PER 100 WBC
PH UR STRIP: 5.5 (ref 5–8)
PLATELET # BLD AUTO: 123 K/UL (ref 150–400)
POTASSIUM SERPL-SCNC: 3.3 MMOL/L (ref 3.5–5.1)
PROT SERPL-MCNC: 7.9 G/DL (ref 6.4–8.2)
PROT UR STRIP-MCNC: 300 MG/DL
RBC # BLD AUTO: 4.99 M/UL (ref 3.8–5.2)
RBC #/AREA URNS HPF: ABNORMAL /HPF (ref 0–5)
SODIUM SERPL-SCNC: 134 MMOL/L (ref 136–145)
SP GR UR REFRACTOMETRY: 1.02 (ref 1–1.03)
SPECIMEN HOLD: NORMAL
URINE CULTURE IF INDICATED: ABNORMAL
UROBILINOGEN UR QL STRIP.AUTO: 1 EU/DL (ref 0.2–1)
WBC # BLD AUTO: 6.8 K/UL (ref 3.6–11)
WBC URNS QL MICRO: ABNORMAL /HPF (ref 0–4)

## 2023-12-16 PROCEDURE — 6360000004 HC RX CONTRAST MEDICATION: Performed by: RADIOLOGY

## 2023-12-16 PROCEDURE — 2580000003 HC RX 258: Performed by: FAMILY MEDICINE

## 2023-12-16 PROCEDURE — 99285 EMERGENCY DEPT VISIT HI MDM: CPT

## 2023-12-16 PROCEDURE — 83605 ASSAY OF LACTIC ACID: CPT

## 2023-12-16 PROCEDURE — 85025 COMPLETE CBC W/AUTO DIFF WBC: CPT

## 2023-12-16 PROCEDURE — 2500000003 HC RX 250 WO HCPCS: Performed by: EMERGENCY MEDICINE

## 2023-12-16 PROCEDURE — 6360000002 HC RX W HCPCS: Performed by: EMERGENCY MEDICINE

## 2023-12-16 PROCEDURE — 96365 THER/PROPH/DIAG IV INF INIT: CPT

## 2023-12-16 PROCEDURE — 2500000003 HC RX 250 WO HCPCS: Performed by: STUDENT IN AN ORGANIZED HEALTH CARE EDUCATION/TRAINING PROGRAM

## 2023-12-16 PROCEDURE — 74177 CT ABD & PELVIS W/CONTRAST: CPT

## 2023-12-16 PROCEDURE — 94761 N-INVAS EAR/PLS OXIMETRY MLT: CPT

## 2023-12-16 PROCEDURE — 6360000002 HC RX W HCPCS: Performed by: STUDENT IN AN ORGANIZED HEALTH CARE EDUCATION/TRAINING PROGRAM

## 2023-12-16 PROCEDURE — 80053 COMPREHEN METABOLIC PANEL: CPT

## 2023-12-16 PROCEDURE — 36415 COLL VENOUS BLD VENIPUNCTURE: CPT

## 2023-12-16 PROCEDURE — 96375 TX/PRO/DX INJ NEW DRUG ADDON: CPT

## 2023-12-16 PROCEDURE — 2580000003 HC RX 258: Performed by: STUDENT IN AN ORGANIZED HEALTH CARE EDUCATION/TRAINING PROGRAM

## 2023-12-16 PROCEDURE — 96374 THER/PROPH/DIAG INJ IV PUSH: CPT

## 2023-12-16 PROCEDURE — 6360000002 HC RX W HCPCS: Performed by: FAMILY MEDICINE

## 2023-12-16 PROCEDURE — 81025 URINE PREGNANCY TEST: CPT

## 2023-12-16 PROCEDURE — 2580000003 HC RX 258: Performed by: EMERGENCY MEDICINE

## 2023-12-16 PROCEDURE — 96376 TX/PRO/DX INJ SAME DRUG ADON: CPT

## 2023-12-16 PROCEDURE — 81001 URINALYSIS AUTO W/SCOPE: CPT

## 2023-12-16 PROCEDURE — 83690 ASSAY OF LIPASE: CPT

## 2023-12-16 PROCEDURE — 1100000000 HC RM PRIVATE

## 2023-12-16 RX ORDER — HYDROMORPHONE HYDROCHLORIDE 2 MG/ML
2 INJECTION, SOLUTION INTRAMUSCULAR; INTRAVENOUS; SUBCUTANEOUS EVERY 4 HOURS PRN
Status: DISCONTINUED | OUTPATIENT
Start: 2023-12-16 | End: 2023-12-16

## 2023-12-16 RX ORDER — ONDANSETRON 4 MG/1
4 TABLET, ORALLY DISINTEGRATING ORAL EVERY 8 HOURS PRN
Status: DISCONTINUED | OUTPATIENT
Start: 2023-12-16 | End: 2023-12-23 | Stop reason: HOSPADM

## 2023-12-16 RX ORDER — 0.9 % SODIUM CHLORIDE 0.9 %
1000 INTRAVENOUS SOLUTION INTRAVENOUS ONCE
Status: COMPLETED | OUTPATIENT
Start: 2023-12-16 | End: 2023-12-16

## 2023-12-16 RX ORDER — ACETAMINOPHEN 650 MG/1
650 SUPPOSITORY RECTAL EVERY 6 HOURS PRN
Status: DISCONTINUED | OUTPATIENT
Start: 2023-12-16 | End: 2023-12-23 | Stop reason: HOSPADM

## 2023-12-16 RX ORDER — SODIUM CHLORIDE 9 MG/ML
INJECTION, SOLUTION INTRAVENOUS PRN
Status: DISCONTINUED | OUTPATIENT
Start: 2023-12-16 | End: 2023-12-23 | Stop reason: HOSPADM

## 2023-12-16 RX ORDER — ACETAMINOPHEN 325 MG/1
650 TABLET ORAL EVERY 6 HOURS PRN
Status: DISCONTINUED | OUTPATIENT
Start: 2023-12-16 | End: 2023-12-23 | Stop reason: HOSPADM

## 2023-12-16 RX ORDER — HYDROMORPHONE HYDROCHLORIDE 1 MG/ML
1 INJECTION, SOLUTION INTRAMUSCULAR; INTRAVENOUS; SUBCUTANEOUS
Status: COMPLETED | OUTPATIENT
Start: 2023-12-16 | End: 2023-12-16

## 2023-12-16 RX ORDER — HYDROMORPHONE HYDROCHLORIDE 2 MG/ML
2 INJECTION, SOLUTION INTRAMUSCULAR; INTRAVENOUS; SUBCUTANEOUS ONCE
Status: COMPLETED | OUTPATIENT
Start: 2023-12-16 | End: 2023-12-16

## 2023-12-16 RX ORDER — BISACODYL 5 MG/1
5 TABLET, DELAYED RELEASE ORAL DAILY
Status: DISCONTINUED | OUTPATIENT
Start: 2023-12-16 | End: 2023-12-22

## 2023-12-16 RX ORDER — ONDANSETRON 2 MG/ML
4 INJECTION INTRAMUSCULAR; INTRAVENOUS ONCE
Status: COMPLETED | OUTPATIENT
Start: 2023-12-16 | End: 2023-12-16

## 2023-12-16 RX ORDER — ONDANSETRON 2 MG/ML
4 INJECTION INTRAMUSCULAR; INTRAVENOUS EVERY 6 HOURS PRN
Status: DISCONTINUED | OUTPATIENT
Start: 2023-12-16 | End: 2023-12-23 | Stop reason: HOSPADM

## 2023-12-16 RX ORDER — ENOXAPARIN SODIUM 100 MG/ML
40 INJECTION SUBCUTANEOUS EVERY 24 HOURS
Status: DISCONTINUED | OUTPATIENT
Start: 2023-12-16 | End: 2023-12-23 | Stop reason: HOSPADM

## 2023-12-16 RX ORDER — SODIUM CHLORIDE 0.9 % (FLUSH) 0.9 %
5-40 SYRINGE (ML) INJECTION PRN
Status: DISCONTINUED | OUTPATIENT
Start: 2023-12-16 | End: 2023-12-23 | Stop reason: HOSPADM

## 2023-12-16 RX ORDER — SODIUM CHLORIDE 0.9 % (FLUSH) 0.9 %
5-40 SYRINGE (ML) INJECTION EVERY 12 HOURS SCHEDULED
Status: DISCONTINUED | OUTPATIENT
Start: 2023-12-16 | End: 2023-12-23 | Stop reason: HOSPADM

## 2023-12-16 RX ORDER — SODIUM CHLORIDE, SODIUM LACTATE, POTASSIUM CHLORIDE, AND CALCIUM CHLORIDE .6; .31; .03; .02 G/100ML; G/100ML; G/100ML; G/100ML
1000 INJECTION, SOLUTION INTRAVENOUS ONCE
Status: DISCONTINUED | OUTPATIENT
Start: 2023-12-16 | End: 2023-12-23 | Stop reason: HOSPADM

## 2023-12-16 RX ORDER — HYDROMORPHONE HYDROCHLORIDE 1 MG/ML
1 INJECTION, SOLUTION INTRAMUSCULAR; INTRAVENOUS; SUBCUTANEOUS
Status: DISCONTINUED | OUTPATIENT
Start: 2023-12-16 | End: 2023-12-16

## 2023-12-16 RX ORDER — NALOXONE HYDROCHLORIDE 0.4 MG/ML
INJECTION, SOLUTION INTRAMUSCULAR; INTRAVENOUS; SUBCUTANEOUS PRN
Status: DISCONTINUED | OUTPATIENT
Start: 2023-12-16 | End: 2023-12-19

## 2023-12-16 RX ORDER — MORPHINE SULFATE 2 MG/ML
2 INJECTION, SOLUTION INTRAMUSCULAR; INTRAVENOUS
Status: COMPLETED | OUTPATIENT
Start: 2023-12-16 | End: 2023-12-16

## 2023-12-16 RX ORDER — POLYETHYLENE GLYCOL 3350 17 G/17G
17 POWDER, FOR SOLUTION ORAL DAILY PRN
Status: DISCONTINUED | OUTPATIENT
Start: 2023-12-16 | End: 2023-12-19

## 2023-12-16 RX ADMIN — PIPERACILLIN AND TAZOBACTAM 4500 MG: 4; .5 INJECTION, POWDER, FOR SOLUTION INTRAVENOUS at 14:44

## 2023-12-16 RX ADMIN — SODIUM CHLORIDE 1000 ML: 9 INJECTION, SOLUTION INTRAVENOUS at 05:53

## 2023-12-16 RX ADMIN — HYDROMORPHONE HYDROCHLORIDE 2 MG: 2 INJECTION, SOLUTION INTRAMUSCULAR; INTRAVENOUS; SUBCUTANEOUS at 14:36

## 2023-12-16 RX ADMIN — HYDROMORPHONE HYDROCHLORIDE 1 MG: 1 INJECTION, SOLUTION INTRAMUSCULAR; INTRAVENOUS; SUBCUTANEOUS at 11:39

## 2023-12-16 RX ADMIN — SODIUM CHLORIDE, PRESERVATIVE FREE 10 ML: 5 INJECTION INTRAVENOUS at 21:18

## 2023-12-16 RX ADMIN — POTASSIUM CHLORIDE: 2 INJECTION, SOLUTION, CONCENTRATE INTRAVENOUS at 19:00

## 2023-12-16 RX ADMIN — MEROPENEM 1000 MG: 1 INJECTION, POWDER, FOR SOLUTION INTRAVENOUS at 17:23

## 2023-12-16 RX ADMIN — ONDANSETRON 4 MG: 2 INJECTION INTRAMUSCULAR; INTRAVENOUS at 04:50

## 2023-12-16 RX ADMIN — ENOXAPARIN SODIUM 40 MG: 100 INJECTION SUBCUTANEOUS at 21:17

## 2023-12-16 RX ADMIN — HYDROMORPHONE HYDROCHLORIDE 1 MG: 1 INJECTION, SOLUTION INTRAMUSCULAR; INTRAVENOUS; SUBCUTANEOUS at 04:53

## 2023-12-16 RX ADMIN — HYDROMORPHONE HYDROCHLORIDE 2 MG: 2 INJECTION INTRAMUSCULAR; INTRAVENOUS; SUBCUTANEOUS at 16:51

## 2023-12-16 RX ADMIN — HYDROMORPHONE HYDROCHLORIDE 1 MG: 1 INJECTION, SOLUTION INTRAMUSCULAR; INTRAVENOUS; SUBCUTANEOUS at 08:46

## 2023-12-16 RX ADMIN — HYDROMORPHONE HYDROCHLORIDE 1 MG: 1 INJECTION, SOLUTION INTRAMUSCULAR; INTRAVENOUS; SUBCUTANEOUS at 06:50

## 2023-12-16 RX ADMIN — MORPHINE SULFATE 2 MG: 2 INJECTION, SOLUTION INTRAMUSCULAR; INTRAVENOUS at 10:18

## 2023-12-16 RX ADMIN — IOPAMIDOL 100 ML: 755 INJECTION, SOLUTION INTRAVENOUS at 07:25

## 2023-12-16 NOTE — DISCHARGE INSTRUCTIONS
Patient Discharge Instructions    Oscar Pino / [id number] : 1975    Admitted 2023 Discharged: 2023     Primary Diagnoses  @Rprob@    Take Home Medications     It is important that you take the medication exactly as they are prescribed. Keep your medication in the bottles provided by the pharmacist and keep a list of the medication names, dosages, and times to be taken in your wallet. Do not take other medications without consulting your doctor. What to do at Home    Recommended diet:  TPN per [facility] instructions    Recommended activity: activity as tolerated    If you experience worse symptoms, please follow up with the surgeons at [facility]. Follow-up with your PCP in a few weeks    [unfilled]     Information obtained by :  I understand that if any problems occur once I am at home I am to contact my physician. I understand and acknowledge receipt of the instructions indicated above.                                                                                                                                            Physician's or R.N.'s Signature                                                                  Date/Time                                                                                                                                              Patient or Representative Signature                                                          Date/Time

## 2023-12-16 NOTE — PROGRESS NOTES
Ultrasound IV by Jesse Briggs RN :  Procedure Note    Ultrasound IV education provided to patient. Opportunities for questions given. Ultrasound used for PIV placement:  20 gauge 1 cm BD Nexiva  lac location. 1 X Attempt(s). Flushed with ease; vigorous blood return. Procedure tolerated well. Primary RN aware of IV placement and added to LDA.       Jesse Briggs RN

## 2023-12-16 NOTE — ED NOTES
Bedside shift change report given to Dixon Frias (oncoming nurse) by Elise Cobos (offgoing nurse). Report included the following information Nurse Handoff Report, Index, Intake/Output, MAR, and Recent Results.         Ijeoma Del Cid RN  12/16/23 [id number]

## 2023-12-16 NOTE — ED PROVIDER NOTES
[facility] EMERGENCY DEPT  EMERGENCY DEPARTMENT ENCOUNTER      Pt Name: Catrina Mcnamara  MRN: [id number]  [address] [facility] [place] 1975  Date of evaluation: 2023  Provider: Adia Robbins MD      HISTORY OF PRESENT ILLNESS      68-year-old female presenting to the ER for abdominal pain. Patient woke up at 1 AM with severe right-sided abdominal pain. She has a history of  complicated by enterotomy and currently has an ileostomy. She is putting out tan-yellow ostomy output currently. Reports severe nausea but no vomiting today. She is requesting Dilaudid and states that this is the only medicine that works for her              Nursing Notes were reviewed. REVIEW OF SYSTEMS         Review of Systems   Constitutional:  Negative for chills and fever. HENT:  Negative for congestion and sore throat. Eyes:  Negative for pain and visual disturbance. Respiratory:  Negative for chest tightness and shortness of breath. Cardiovascular:  Negative for chest pain and leg swelling. Gastrointestinal:  Positive for abdominal pain and nausea. Negative for blood in stool, constipation and vomiting. Genitourinary:  Negative for dysuria. Musculoskeletal:  Negative for back pain. Skin:  Negative for rash. Neurological:  Negative for weakness and headaches. All other systems reviewed and are negative.           PAST MEDICAL HISTORY     Past Medical History:   Diagnosis Date    Anxiety     Bowel obstruction ([address])     Breast cancer ([address])     rt mastectomy    Cancer ([address])     cancer in right breast    Depression     FH: breast cancer     maternal grandmother,  age 37    GERD (gastroesophageal reflux disease)     Glaucoma     Headache     Hypersomnolent     Joint pain     Leg swelling     Muscle weakness     RIGHT ARM R/T SHOULDER PAIN    Other ill-defined conditions(819.89)     glaucoma, NEUROPATHY    Radiation therapy complication     radiation and chemo    Rectal fissure 2010    Uterine polyp  Visual disturbance          SURGICAL HISTORY       Past Surgical History:   Procedure Laterality Date    APPENDECTOMY  1977    BREAST BIOPSY      BREAST RECONSTRUCTION  2013    BREAST RECONSTRUCTION performed by Karen Carrera MD at [address] [facility]      ganglion cyst RIGHT wrist    GYN      uterine polyp removed, leep    GYN      LEEP    HEENT  2010    eye surgery    MASTECTOMY Right     MASTECTOMY, MODIFIED RADICAL  2013    BREAST MASTECTOMY MODIFIED RADICAL performed by Palak Hylton MD at [address] [facility]      anal fissure removed. OTHER SURGICAL HISTORY      ganglion cysts removed wrists    OVARY REMOVAL      removal of LEFT ovary and fallopian tube    SMALL INTESTINE SURGERY  2010    small bowel obstruction          CURRENT MEDICATIONS       Previous Medications    BIMATOPROST (LUMIGAN) 0.03 % OPHTHALMIC DROPS    Apply 1 drop to eye every evening    BRIMONIDINE-TIMOLOL (COMBIGAN) 0.2-0.5 % OPHTHALMIC SOLUTION    Apply 1 drop to eye 2 times daily    BRINZOLAMIDE (AZOPT) 1 % OPHTHALMIC SUSPENSION    Apply 1 drop to eye 2 times daily    CLOMIPHENE (CLOMID) 50 MG TABLET    TK 2 TS PO QD FOR 5 DAYS    NETARSUDIL-LATANOPROST (ROCKLATAN) 0.02-0.005 % OPHTHALMIC SOLUTION    ceived the following from [facility]: Outside name: Travon Lemmings 0.02-0.005 % drop       ALLERGIES     Ketamine, Morphine, and Toradol [ketorolac tromethamine]    FAMILY HISTORY       Family History   Problem Relation Age of Onset    Emergence Delirium Neg Hx     Post-op Nausea/Vomiting Neg Hx     Delayed Awakening Neg Hx     Pseudochol.  Deficiency Neg Hx     Cancer Maternal Grandmother         breast cancer,  age 37    Other Neg Hx     Post-op Cognitive Dysfunction Neg Hx     Malig Hypertherm Neg Hx     Breast Cancer Maternal Grandmother         late 42's    Anesth Problems Neg Hx           SOCIAL HISTORY       Social History

## 2023-12-16 NOTE — ED TRIAGE NOTES
Pt states started with severe abd pain around 0100 right upper quad.  States recent ileostomy placement in August having traumatic birth and currrently has pic line receiving TPN,

## 2023-12-17 ENCOUNTER — APPOINTMENT (OUTPATIENT)
Facility: HOSPITAL | Age: 48
DRG: 445 | End: 2023-12-17
Payer: OTHER GOVERNMENT

## 2023-12-17 LAB
ALBUMIN SERPL-MCNC: 3.4 G/DL (ref 3.5–5)
ALBUMIN SERPL-MCNC: 3.6 G/DL (ref 3.5–5)
ALBUMIN/GLOB SERPL: 0.8 (ref 1.1–2.2)
ALBUMIN/GLOB SERPL: 0.9 (ref 1.1–2.2)
ALP SERPL-CCNC: 119 U/L (ref 45–117)
ALP SERPL-CCNC: 124 U/L (ref 45–117)
ALT SERPL-CCNC: 34 U/L (ref 12–78)
ALT SERPL-CCNC: 34 U/L (ref 12–78)
ANION GAP SERPL CALC-SCNC: 6 MMOL/L (ref 5–15)
AST SERPL-CCNC: 23 U/L (ref 15–37)
AST SERPL-CCNC: 25 U/L (ref 15–37)
BASOPHILS # BLD: 0 K/UL (ref 0–0.1)
BASOPHILS NFR BLD: 0 % (ref 0–1)
BILIRUB DIRECT SERPL-MCNC: 0.3 MG/DL (ref 0–0.2)
BILIRUB SERPL-MCNC: 0.9 MG/DL (ref 0.2–1)
BILIRUB SERPL-MCNC: 1 MG/DL (ref 0.2–1)
BUN SERPL-MCNC: 8 MG/DL (ref 6–20)
BUN/CREAT SERPL: 11 (ref 12–20)
CALCIUM SERPL-MCNC: 8.8 MG/DL (ref 8.5–10.1)
CHLORIDE SERPL-SCNC: 105 MMOL/L (ref 97–108)
CO2 SERPL-SCNC: 25 MMOL/L (ref 21–32)
CREAT SERPL-MCNC: 0.75 MG/DL (ref 0.55–1.02)
DIFFERENTIAL METHOD BLD: ABNORMAL
EOSINOPHIL # BLD: 0 K/UL (ref 0–0.4)
EOSINOPHIL NFR BLD: 0 % (ref 0–7)
ERYTHROCYTE [DISTWIDTH] IN BLOOD BY AUTOMATED COUNT: 15.6 % (ref 11.5–14.5)
GLOBULIN SER CALC-MCNC: 3.9 G/DL (ref 2–4)
GLOBULIN SER CALC-MCNC: 4.5 G/DL (ref 2–4)
GLUCOSE SERPL-MCNC: 114 MG/DL (ref 65–100)
HCT VFR BLD AUTO: 40.5 % (ref 35–47)
HGB BLD-MCNC: 13.3 G/DL (ref 11.5–16)
IMM GRANULOCYTES # BLD AUTO: 0 K/UL (ref 0–0.04)
IMM GRANULOCYTES NFR BLD AUTO: 0 % (ref 0–0.5)
LACTATE SERPL-SCNC: 0.8 MMOL/L (ref 0.4–2)
LYMPHOCYTES # BLD: 1 K/UL (ref 0.8–3.5)
LYMPHOCYTES NFR BLD: 7 % (ref 12–49)
MAGNESIUM SERPL-MCNC: 1.6 MG/DL (ref 1.6–2.4)
MCH RBC QN AUTO: 23.1 PG (ref 26–34)
MCHC RBC AUTO-ENTMCNC: 32.8 G/DL (ref 30–36.5)
MCV RBC AUTO: 70.2 FL (ref 80–99)
MONOCYTES # BLD: 0.8 K/UL (ref 0–1)
MONOCYTES NFR BLD: 6 % (ref 5–13)
NEUTS SEG # BLD: 11.9 K/UL (ref 1.8–8)
NEUTS SEG NFR BLD: 87 % (ref 32–75)
NRBC # BLD: 0 K/UL (ref 0–0.01)
NRBC BLD-RTO: 0 PER 100 WBC
PHOSPHATE SERPL-MCNC: 2.9 MG/DL (ref 2.6–4.7)
PLATELET # BLD AUTO: 132 K/UL (ref 150–400)
POTASSIUM SERPL-SCNC: 3.8 MMOL/L (ref 3.5–5.1)
PROT SERPL-MCNC: 7.5 G/DL (ref 6.4–8.2)
PROT SERPL-MCNC: 7.9 G/DL (ref 6.4–8.2)
RBC # BLD AUTO: 5.77 M/UL (ref 3.8–5.2)
SODIUM SERPL-SCNC: 136 MMOL/L (ref 136–145)
WBC # BLD AUTO: 13.7 K/UL (ref 3.6–11)

## 2023-12-17 PROCEDURE — 6360000002 HC RX W HCPCS: Performed by: FAMILY MEDICINE

## 2023-12-17 PROCEDURE — 97162 PT EVAL MOD COMPLEX 30 MIN: CPT

## 2023-12-17 PROCEDURE — 71045 X-RAY EXAM CHEST 1 VIEW: CPT

## 2023-12-17 PROCEDURE — 80076 HEPATIC FUNCTION PANEL: CPT

## 2023-12-17 PROCEDURE — 74183 MRI ABD W/O CNTR FLWD CNTR: CPT

## 2023-12-17 PROCEDURE — 1100000000 HC RM PRIVATE

## 2023-12-17 PROCEDURE — 87040 BLOOD CULTURE FOR BACTERIA: CPT

## 2023-12-17 PROCEDURE — A9579 GAD-BASE MR CONTRAST NOS,1ML: HCPCS

## 2023-12-17 PROCEDURE — 6360000004 HC RX CONTRAST MEDICATION

## 2023-12-17 PROCEDURE — 97530 THERAPEUTIC ACTIVITIES: CPT

## 2023-12-17 PROCEDURE — 83735 ASSAY OF MAGNESIUM: CPT

## 2023-12-17 PROCEDURE — 2580000003 HC RX 258: Performed by: FAMILY MEDICINE

## 2023-12-17 PROCEDURE — 36415 COLL VENOUS BLD VENIPUNCTURE: CPT

## 2023-12-17 PROCEDURE — 85025 COMPLETE CBC W/AUTO DIFF WBC: CPT

## 2023-12-17 PROCEDURE — 97116 GAIT TRAINING THERAPY: CPT

## 2023-12-17 PROCEDURE — 97165 OT EVAL LOW COMPLEX 30 MIN: CPT

## 2023-12-17 PROCEDURE — 84100 ASSAY OF PHOSPHORUS: CPT

## 2023-12-17 PROCEDURE — 83605 ASSAY OF LACTIC ACID: CPT

## 2023-12-17 PROCEDURE — 94761 N-INVAS EAR/PLS OXIMETRY MLT: CPT

## 2023-12-17 PROCEDURE — 6370000000 HC RX 637 (ALT 250 FOR IP): Performed by: FAMILY MEDICINE

## 2023-12-17 PROCEDURE — 80053 COMPREHEN METABOLIC PANEL: CPT

## 2023-12-17 RX ADMIN — POTASSIUM CHLORIDE: 2 INJECTION, SOLUTION, CONCENTRATE INTRAVENOUS at 09:04

## 2023-12-17 RX ADMIN — BISACODYL 5 MG: 5 TABLET, COATED ORAL at 09:08

## 2023-12-17 RX ADMIN — MEROPENEM 1000 MG: 1 INJECTION, POWDER, FOR SOLUTION INTRAVENOUS at 17:14

## 2023-12-17 RX ADMIN — ENOXAPARIN SODIUM 40 MG: 100 INJECTION SUBCUTANEOUS at 22:16

## 2023-12-17 RX ADMIN — GADOTERIDOL 17 ML: 279.3 INJECTION, SOLUTION INTRAVENOUS at 18:43

## 2023-12-17 RX ADMIN — SODIUM CHLORIDE, PRESERVATIVE FREE 10 ML: 5 INJECTION INTRAVENOUS at 22:17

## 2023-12-17 RX ADMIN — SODIUM CHLORIDE, PRESERVATIVE FREE 10 ML: 5 INJECTION INTRAVENOUS at 09:17

## 2023-12-17 RX ADMIN — SODIUM CHLORIDE: 9 INJECTION, SOLUTION INTRAVENOUS at 01:40

## 2023-12-17 RX ADMIN — MEROPENEM 1000 MG: 1 INJECTION, POWDER, FOR SOLUTION INTRAVENOUS at 01:42

## 2023-12-17 RX ADMIN — MEROPENEM 1000 MG: 1 INJECTION, POWDER, FOR SOLUTION INTRAVENOUS at 09:16

## 2023-12-17 NOTE — PROGRESS NOTES
Reached out to [facility], they are on diversion and unable to accept as a transfer. Spoke with her surgeon Dr. Phuong Freire there who agrees with perc drain and IV abx. She will try and arrange outpatient follow up with her soon.

## 2023-12-17 NOTE — PLAN OF CARE
Problem: Occupational Therapy - Adult  Goal: By Discharge: Performs self-care activities at highest level of function for planned discharge setting. See evaluation for individualized goals. Description: FUNCTIONAL STATUS PRIOR TO ADMISSION: Patient was independent and active without use of DME.    HOME SUPPORT PRIOR TO ADMISSION: The patient lived with . Occupational Therapy Goals:  Initiated 2023  1. Patient will perform grooming, standing at sink, with Modified [place] within 7 day(s). 2.  Patient will perform lower body dressing with Modified [place] within 7 day(s). 3.  Patient will perform bathing with Modified [place] within 7 day(s). 4.  Patient will perform toilet transfers with Modified [place]  within 7 day(s). 5.  Patient will perform all aspects of toileting with Modified [place] within 7 day(s). 6.  Patient will participate in upper extremity therapeutic exercise/activities with Fountain for 10 minutes within 7 day(s). Outcome: Progressing     OCCUPATIONAL THERAPY EVALUATION    Patient: Delgado Siu (46 y.o. female)  Date: 2023  Primary Diagnosis: Acute cholecystitis [K81.0]         Precautions:                    ASSESSMENT :  The patient is limited by decreased functional mobility, independence in ADLs, high-level IADLs, strength, activity tolerance, and increased pain levels following admission for abdominal pain. Pt with previous complicated  now with and ileostomy and PICC line for TPN since 2023. Based on the impairments listed above patient presents just below her baseline and endorses significant fatigue and weakness due to only on TPN since August, and reports fall as well.  Pt today received following mobility with PT, is agreeable to OT assessment and demonstrates functional reach to distal LB via crossed leg technique and is receptive to education on ECT, activity pacing, and ADL modifications due to abdominal

## 2023-12-17 NOTE — PROGRESS NOTES
Follow up spiritual care with Mrs. Sal Mohs on B5. Pt lying back in bed and has her  and baby sitting bedside. They both warmly welcome  and begin to share much life review. She shared all her medical challenges she has faced. She said she is feeling emotionally secure and okay about being in the hospital and her strong Viveca Caller adan that is helping her cope through. Her requests are for prayer and a Daily Devotional.  prayed with her and brought her a Daily Devotional. They expressed much appreciation. Chaplain Leo Ayers M.Div., [facility].   [facility] Team [phone])

## 2023-12-17 NOTE — PROGRESS NOTES
Spiritual Care Assessment/Progress Note  [facility]    Name: Catrina Mcnamara MRN: [id number]    Age: 46 y.o. Sex: female   Language: English     Date: 12/17/2023            Total Time Calculated: 15 min              Spiritual Assessment begun in [facility] [address] [facility] 1  Service Provided For[de-identified] Patient and family together  Referral/Consult From[de-identified] Nurse  Encounter Overview/Reason : Initial Encounter    Spiritual beliefs:      [x] Involved in a adan tradition/spiritual practice:  Noelle Oakes      [x] Supported by a adan community:      [] Claims no spiritual orientation:      [] Seeking spiritual identity:           [] Adheres to an individual form of spirituality:      [] Not able to assess:                Identified resources for coping and support system:   Support System: Spouse, Family members, Religious/adan community       [x] Prayer                  [] Devotional reading               [] Music                  [] Guided Imagery     [] Pet visits                                        [] Other: (COMMENT)     Specific area/focus of visit   Encounter:    Crisis:    Spiritual/Emotional needs: Type: Spiritual Support  Ritual, Rites and Sacraments:    Grief, Loss, and Adjustments:    Ethics/Mediation:    Behavioral Health:    Palliative Care: Advance Care Planning:      Plan/Referrals: Continue Support (comment) (as able)    Narrative: Therapist provider called Chaplains office suggesting that Pt could use some extra support.  met Pt as she is lying in bed holding her infant baby boy, and listening to her Cheondoism service on her cell phone. Pt's  is also present and shared that their Cheondoism service is in progress.  said he would try again at a later time. They expressed through smiles their appreciation. Chaplain Eduardo Jim M.Div., [facility].   [facility] Team [phone])

## 2023-12-17 NOTE — ED NOTES
Bedside and Verbal shift change report given to Porfirio (oncoming nurse) by Shanita Gutierrez (offgoing nurse). Report included the following information Nurse Handoff Report, Index, ED Encounter Summary, ED SBAR, Adult Overview, Surgery Report, and MAR.         Elbert Mirza RN  12/16/23 1924

## 2023-12-17 NOTE — PLAN OF CARE
Problem: Physical Therapy - Adult  Goal: By Discharge: Performs mobility at highest level of function for planned discharge setting. See evaluation for individualized goals. Description: FUNCTIONAL STATUS PRIOR TO ADMISSION: Patient was independent and active without use of DME.    HOME SUPPORT PRIOR TO ADMISSION: The patient lived with . Physical Therapy Goals  Initiated 2023  1. Patient will move from supine to sit and sit to supine in bed with modified independence within 7 day(s). 2.  Patient will perform sit to stand with modified independence within 7 day(s). 3.  Patient will transfer from bed to chair and chair to bed with modified independence using the least restrictive device within 7 day(s). 4.  Patient will ambulate with supervision/set-up for 150 feet with the least restrictive device within 7 day(s). 5.  Patient will ascend/descend 4 stairs with 2 handrail(s) with minimal assistance within 7 day(s). Outcome: Progressing   PHYSICAL THERAPY EVALUATION    Patient: Toi Saavedra (46 y.o. female)  Date: 2023  Primary Diagnosis: Acute cholecystitis [K81.0]       Precautions:                      ASSESSMENT :   DEFICITS/IMPAIRMENTS:   The patient is limited by decreased functional mobility, strength, activity tolerance following admission for abdominal pain. Patient with previous complicated  now with an ostomy and PICC line for TBN. Patient reports overall continued fatigue and decreased strength due to only on TPN since August  patient reports fall prior to admission due to weakness. Patient currently being seen by home health nurse and tele-health PT and OT (who provided recent new DME). Based on the impairments listed above patient today is having increased abdominal pain with movement but able to perform all activity with 1 person assist at a MIN-CGA level.   Patient did benefit from RW with upright activity due to continued pain, did have 2 bouts of slight

## 2023-12-17 NOTE — PROGRESS NOTES
[facility]                                                                               Hospitalist Progress Note  Linda Muñiz MD          Date of Service:  2023  NAME:  Neha Reyes  :  1975  MRN:  [id number]    Please note that this dictation was completed with [employer], the computer voice recognition software. Quite often unanticipated grammatical, syntax, homophones, and other interpretive errors are inadvertently transcribed by the computer software. Please disregard these errors. Please excuse any errors that have escaped final proofreading. Admission Summary:    46 y.o. female who presented with right-sided abdominal pain that started in the middle of the night. She also reports nausea but has not had any vomiting. She has an ileostomy since having a  at [address] [place] [address] in August complicated by bladder injury, enterocutaneous fistula and multiple pelvic abscesses. She now has an ileostomy. She was discharged at that time on TPN and currently is still on TPN. In the ED she was found to have acute cholecystitis. Interval history / Subjective:     Patient is reporting severe pain in her right abdomen. Had nausea and vomiting yesterday but none today. General surgeon at bedside. Assessment & Plan:     Anticipated discharge date : TBD  Anticipated disposition : Home  Barriers to discharge : Medical stability     Acute abdominal pain  Unclear etiology at this time. CT abdomen pelvis shows gallbladder wall thickening. Discussed CT results with general surgeon and interventional radiologist.  There are loops of small bowel around the gallbladder which could be the source of thickening.    -Continue meropenem  - Continue Dilaudid PCA. Basal rate increased to 0.5 mg/hour, bolus 0.25 mg every 10 minutes.   - IR has been consulted, discussed with IR on-call Dr. Ronda Garland and general surgeon Dr. Vamsi Munson will proceed

## 2023-12-18 ENCOUNTER — APPOINTMENT (OUTPATIENT)
Facility: HOSPITAL | Age: 48
DRG: 445 | End: 2023-12-18
Attending: SURGERY
Payer: OTHER GOVERNMENT

## 2023-12-18 LAB
ALBUMIN SERPL-MCNC: 3 G/DL (ref 3.5–5)
ALBUMIN/GLOB SERPL: 0.7 (ref 1.1–2.2)
ALP SERPL-CCNC: 120 U/L (ref 45–117)
ALT SERPL-CCNC: 29 U/L (ref 12–78)
ANION GAP SERPL CALC-SCNC: 6 MMOL/L (ref 5–15)
AST SERPL-CCNC: 20 U/L (ref 15–37)
BASOPHILS # BLD: 0 K/UL (ref 0–0.1)
BASOPHILS NFR BLD: 0 % (ref 0–1)
BILIRUB SERPL-MCNC: 1 MG/DL (ref 0.2–1)
BUN SERPL-MCNC: 8 MG/DL (ref 6–20)
BUN/CREAT SERPL: 10 (ref 12–20)
CALCIUM SERPL-MCNC: 9.5 MG/DL (ref 8.5–10.1)
CHLORIDE SERPL-SCNC: 104 MMOL/L (ref 97–108)
CO2 SERPL-SCNC: 25 MMOL/L (ref 21–32)
CREAT SERPL-MCNC: 0.8 MG/DL (ref 0.55–1.02)
DIFFERENTIAL METHOD BLD: ABNORMAL
EOSINOPHIL # BLD: 0 K/UL (ref 0–0.4)
EOSINOPHIL NFR BLD: 0 % (ref 0–7)
ERYTHROCYTE [DISTWIDTH] IN BLOOD BY AUTOMATED COUNT: 15.8 % (ref 11.5–14.5)
GLOBULIN SER CALC-MCNC: 4.4 G/DL (ref 2–4)
GLUCOSE SERPL-MCNC: 83 MG/DL (ref 65–100)
HCT VFR BLD AUTO: 37.5 % (ref 35–47)
HGB BLD-MCNC: 12.1 G/DL (ref 11.5–16)
IMM GRANULOCYTES # BLD AUTO: 0 K/UL (ref 0–0.04)
IMM GRANULOCYTES NFR BLD AUTO: 0 % (ref 0–0.5)
LYMPHOCYTES # BLD: 1.2 K/UL (ref 0.8–3.5)
LYMPHOCYTES NFR BLD: 8 % (ref 12–49)
MAGNESIUM SERPL-MCNC: 1.8 MG/DL (ref 1.6–2.4)
MCH RBC QN AUTO: 22.7 PG (ref 26–34)
MCHC RBC AUTO-ENTMCNC: 32.3 G/DL (ref 30–36.5)
MCV RBC AUTO: 70.5 FL (ref 80–99)
MONOCYTES # BLD: 1 K/UL (ref 0–1)
MONOCYTES NFR BLD: 7 % (ref 5–13)
NEUTS SEG # BLD: 12.3 K/UL (ref 1.8–8)
NEUTS SEG NFR BLD: 85 % (ref 32–75)
NRBC # BLD: 0 K/UL (ref 0–0.01)
NRBC BLD-RTO: 0 PER 100 WBC
PHOSPHATE SERPL-MCNC: 3.2 MG/DL (ref 2.6–4.7)
PLATELET # BLD AUTO: 116 K/UL (ref 150–400)
POTASSIUM SERPL-SCNC: 3.8 MMOL/L (ref 3.5–5.1)
PROT SERPL-MCNC: 7.4 G/DL (ref 6.4–8.2)
RBC # BLD AUTO: 5.32 M/UL (ref 3.8–5.2)
RBC MORPH BLD: ABNORMAL
SODIUM SERPL-SCNC: 135 MMOL/L (ref 136–145)
WBC # BLD AUTO: 14.5 K/UL (ref 3.6–11)

## 2023-12-18 PROCEDURE — 87205 SMEAR GRAM STAIN: CPT

## 2023-12-18 PROCEDURE — C1769 GUIDE WIRE: HCPCS | Performed by: STUDENT IN AN ORGANIZED HEALTH CARE EDUCATION/TRAINING PROGRAM

## 2023-12-18 PROCEDURE — 47490 INCISION OF GALLBLADDER: CPT

## 2023-12-18 PROCEDURE — 6360000002 HC RX W HCPCS: Performed by: STUDENT IN AN ORGANIZED HEALTH CARE EDUCATION/TRAINING PROGRAM

## 2023-12-18 PROCEDURE — 2580000003 HC RX 258: Performed by: FAMILY MEDICINE

## 2023-12-18 PROCEDURE — 76942 ECHO GUIDE FOR BIOPSY: CPT

## 2023-12-18 PROCEDURE — 6370000000 HC RX 637 (ALT 250 FOR IP): Performed by: FAMILY MEDICINE

## 2023-12-18 PROCEDURE — 84100 ASSAY OF PHOSPHORUS: CPT

## 2023-12-18 PROCEDURE — 1100000000 HC RM PRIVATE

## 2023-12-18 PROCEDURE — C1729 CATH, DRAINAGE: HCPCS | Performed by: STUDENT IN AN ORGANIZED HEALTH CARE EDUCATION/TRAINING PROGRAM

## 2023-12-18 PROCEDURE — 80053 COMPREHEN METABOLIC PANEL: CPT

## 2023-12-18 PROCEDURE — 0F9430Z DRAINAGE OF GALLBLADDER WITH DRAINAGE DEVICE, PERCUTANEOUS APPROACH: ICD-10-PCS | Performed by: STUDENT IN AN ORGANIZED HEALTH CARE EDUCATION/TRAINING PROGRAM

## 2023-12-18 PROCEDURE — 2500000003 HC RX 250 WO HCPCS: Performed by: STUDENT IN AN ORGANIZED HEALTH CARE EDUCATION/TRAINING PROGRAM

## 2023-12-18 PROCEDURE — 85025 COMPLETE CBC W/AUTO DIFF WBC: CPT

## 2023-12-18 PROCEDURE — 87070 CULTURE OTHR SPECIMN AEROBIC: CPT

## 2023-12-18 PROCEDURE — C1713 ANCHOR/SCREW BN/BN,TIS/BN: HCPCS | Performed by: STUDENT IN AN ORGANIZED HEALTH CARE EDUCATION/TRAINING PROGRAM

## 2023-12-18 PROCEDURE — 6360000002 HC RX W HCPCS: Performed by: FAMILY MEDICINE

## 2023-12-18 PROCEDURE — 2580000003 HC RX 258: Performed by: STUDENT IN AN ORGANIZED HEALTH CARE EDUCATION/TRAINING PROGRAM

## 2023-12-18 PROCEDURE — 94761 N-INVAS EAR/PLS OXIMETRY MLT: CPT

## 2023-12-18 PROCEDURE — C1894 INTRO/SHEATH, NON-LASER: HCPCS | Performed by: STUDENT IN AN ORGANIZED HEALTH CARE EDUCATION/TRAINING PROGRAM

## 2023-12-18 PROCEDURE — 2709999900 HC NON-CHARGEABLE SUPPLY: Performed by: STUDENT IN AN ORGANIZED HEALTH CARE EDUCATION/TRAINING PROGRAM

## 2023-12-18 PROCEDURE — 36415 COLL VENOUS BLD VENIPUNCTURE: CPT

## 2023-12-18 PROCEDURE — 99152 MOD SED SAME PHYS/QHP 5/>YRS: CPT | Performed by: STUDENT IN AN ORGANIZED HEALTH CARE EDUCATION/TRAINING PROGRAM

## 2023-12-18 PROCEDURE — 83735 ASSAY OF MAGNESIUM: CPT

## 2023-12-18 PROCEDURE — 99221 1ST HOSP IP/OBS SF/LOW 40: CPT | Performed by: NURSE PRACTITIONER

## 2023-12-18 RX ORDER — MIDAZOLAM HYDROCHLORIDE 1 MG/ML
INJECTION INTRAMUSCULAR; INTRAVENOUS PRN
Status: COMPLETED | OUTPATIENT
Start: 2023-12-18 | End: 2023-12-18

## 2023-12-18 RX ORDER — FENTANYL CITRATE 50 UG/ML
INJECTION, SOLUTION INTRAMUSCULAR; INTRAVENOUS PRN
Status: COMPLETED | OUTPATIENT
Start: 2023-12-18 | End: 2023-12-18

## 2023-12-18 RX ORDER — LIDOCAINE HYDROCHLORIDE 10 MG/ML
INJECTION, SOLUTION EPIDURAL; INFILTRATION; INTRACAUDAL; PERINEURAL PRN
Status: COMPLETED | OUTPATIENT
Start: 2023-12-18 | End: 2023-12-18

## 2023-12-18 RX ORDER — OCTREOTIDE ACETATE 100 UG/ML
100 INJECTION, SOLUTION INTRAVENOUS; SUBCUTANEOUS EVERY 8 HOURS
Status: DISCONTINUED | OUTPATIENT
Start: 2023-12-18 | End: 2023-12-23 | Stop reason: HOSPADM

## 2023-12-18 RX ADMIN — I.V. FAT EMULSION 250 ML: 20 EMULSION INTRAVENOUS at 18:13

## 2023-12-18 RX ADMIN — BISACODYL 5 MG: 5 TABLET, COATED ORAL at 10:14

## 2023-12-18 RX ADMIN — Medication: at 14:26

## 2023-12-18 RX ADMIN — MEROPENEM 1000 MG: 1 INJECTION, POWDER, FOR SOLUTION INTRAVENOUS at 17:37

## 2023-12-18 RX ADMIN — FENTANYL CITRATE 50 MCG: 50 INJECTION, SOLUTION INTRAMUSCULAR; INTRAVENOUS at 13:25

## 2023-12-18 RX ADMIN — LIDOCAINE HYDROCHLORIDE 8 ML: 10 INJECTION, SOLUTION EPIDURAL; INFILTRATION; INTRACAUDAL; PERINEURAL at 13:24

## 2023-12-18 RX ADMIN — SODIUM CHLORIDE, PRESERVATIVE FREE 10 ML: 5 INJECTION INTRAVENOUS at 10:18

## 2023-12-18 RX ADMIN — OCTREOTIDE ACETATE 100 MCG: 100 INJECTION, SOLUTION INTRAVENOUS; SUBCUTANEOUS at 21:00

## 2023-12-18 RX ADMIN — MEROPENEM 1000 MG: 1 INJECTION, POWDER, FOR SOLUTION INTRAVENOUS at 01:49

## 2023-12-18 RX ADMIN — WATER: 1 INJECTION INTRAMUSCULAR; INTRAVENOUS; SUBCUTANEOUS at 18:00

## 2023-12-18 RX ADMIN — MEROPENEM 1000 MG: 1 INJECTION, POWDER, FOR SOLUTION INTRAVENOUS at 10:18

## 2023-12-18 RX ADMIN — SODIUM CHLORIDE, PRESERVATIVE FREE 10 ML: 5 INJECTION INTRAVENOUS at 01:51

## 2023-12-18 RX ADMIN — MIDAZOLAM HYDROCHLORIDE 2 MG: 1 INJECTION, SOLUTION INTRAMUSCULAR; INTRAVENOUS at 13:24

## 2023-12-18 RX ADMIN — Medication: at 07:31

## 2023-12-18 RX ADMIN — SODIUM CHLORIDE, PRESERVATIVE FREE 10 ML: 5 INJECTION INTRAVENOUS at 21:02

## 2023-12-18 RX ADMIN — ENOXAPARIN SODIUM 40 MG: 100 INJECTION SUBCUTANEOUS at 21:00

## 2023-12-18 RX ADMIN — MIDAZOLAM HYDROCHLORIDE 0.5 MG: 1 INJECTION, SOLUTION INTRAMUSCULAR; INTRAVENOUS at 13:37

## 2023-12-18 RX ADMIN — OCTREOTIDE ACETATE 100 MCG: 100 INJECTION, SOLUTION INTRAVENOUS; SUBCUTANEOUS at 15:57

## 2023-12-18 RX ADMIN — MIDAZOLAM HYDROCHLORIDE 1 MG: 1 INJECTION, SOLUTION INTRAMUSCULAR; INTRAVENOUS at 13:32

## 2023-12-18 RX ADMIN — FENTANYL CITRATE 50 MCG: 50 INJECTION, SOLUTION INTRAMUSCULAR; INTRAVENOUS at 13:32

## 2023-12-18 NOTE — PROGRESS NOTES
Physical Therapy Note    Patient reports leaking of ostomy pouch which is to be addressed by wound care. She is ambulating to and from the restroom. Will return after wound care if able.

## 2023-12-18 NOTE — PROCEDURES
Interventional Radiology  Procedure Note        12/18/2023 4:34 PM    Patient: Paolo Garcia     Informed consent obtained    Diagnosis: Acute cholecystitis    Procedure(s): Percutaneous cholecystostomy tube    Estimated blood loss: less than 5cc    Anesthesia: Moderate sedation    Specimens removed:  20cc dark, purulent bile    Complications: None    Implants: None    Primary Physician: Saúl Molina MD    Recommendations: N/A    Full dictated report to follow    Saúl Molina MD  Interventional Radiology  [facility] Radiology, [facility].  4:34 PM, 12/18/2023

## 2023-12-18 NOTE — PROGRESS NOTES
Occupational Therapy    Completed chart review and discussed patient with PT. PT recently attempted to see and patient had leaking from ostomy pouch. Will defer and continue to follow for OT.     Thank you,    Caryle Minors, OT

## 2023-12-18 NOTE — PROGRESS NOTES
1:45 PM    TRANSFER - IN REPORT:    Verbal report received from [address] [place] [address] Road on [facility]  being received from [facility] for routine post-op      Report consisted of patient's Situation, Background, Assessment and   Recommendations(SBAR). Information from the following report(s) Nurse Handoff Report was reviewed with the receiving nurse. Opportunity for questions and clarification was provided. Assessment completed upon patient's arrival to unit and care assumed.     2:01 PM    TRANSFER - OUT REPORT:    Verbal report given to [facility] on [facility]  being transferred to 5th Floor for routine progression of patient care       Report consisted of patient's Situation, Background, Assessment and   Recommendations(SBAR). Information from the following report(s) Nurse Handoff Report, Recent Results, and Cardiac Rhythm ST  was reviewed with the receiving nurse. Lines:   Peripheral IV 12/16/23 Left; Anterior Forearm (Active)   Site Assessment Clean, dry & intact 12/17/23 1540   Line Status Infusing 12/17/23 1540   Line Care Cap changed 12/17/23 1540   Phlebitis Assessment No symptoms 12/17/23 1540   Infiltration Assessment 0 12/17/23 1540   Alcohol Cap Used Yes 12/17/23 1540   Dressing Status Clean, dry & intact 12/17/23 1540   Dressing Type Transparent 12/17/23 1540        Opportunity for questions and clarification was provided.       Patient transported with:  [employer]

## 2023-12-18 NOTE — H&P
Radiology History and Physical    Patient: Rene Barrett 46 y.o. female       Chief Complaint: Abdominal Pain      History of Present Illness: 46year old woman with acute cholecystitis    History:    Past Medical History:   Diagnosis Date    Anxiety     Bowel obstruction ([address])     Breast cancer ([address]) 2013    rt mastectomy    Cancer ([address])     cancer in right breast    Depression     FH: breast cancer     maternal grandmother,  age 37    GERD (gastroesophageal reflux disease)     Glaucoma     Headache     Hypersomnolent     Joint pain     Leg swelling     Muscle weakness     RIGHT ARM R/T SHOULDER PAIN    Other ill-defined conditions(799.89)     glaucoma, NEUROPATHY    Radiation therapy complication     radiation and chemo    Rectal fissure     Uterine polyp     Visual disturbance      Family History   Problem Relation Age of Onset    Emergence Delirium Neg Hx     Post-op Nausea/Vomiting Neg Hx     Delayed Awakening Neg Hx     Pseudochol.  Deficiency Neg Hx     Cancer Maternal Grandmother         breast cancer,  age 37    Other Neg Hx     Post-op Cognitive Dysfunction Neg Hx     Malig Hypertherm Neg Hx     Breast Cancer Maternal Grandmother         late 42's    Anesth Problems Neg Hx      Social History     Socioeconomic History    Marital status:      Spouse name: Not on file    Number of children: Not on file    Years of education: Not on file    Highest education level: Not on file   Occupational History    Not on file   Tobacco Use    Smoking status: Never    Smokeless tobacco: Never   Substance and Sexual Activity    Alcohol use: No    Drug use: No    Sexual activity: Not on file   Other Topics Concern    Not on file   Social History Narrative    Not on file     Social Determinants of Health     Financial Resource Strain: Not on file   Food Insecurity: No Food Insecurity (2023)    Hunger Vital Sign     Worried About Running Out of Food in the Last Year: Never true     Ran Out of

## 2023-12-18 NOTE — PROGRESS NOTES
21:17 Lucho Campbell and informed her that the PICC line tip was in the SVC. Asked if this meant that it was okay to use. 21:20 -Archie Plunkett indicated that Radiologist would have to confirm placement. 2135 - Called Dr. Rina Keyes to confirm PICC line placement for use with the TPN. Awaiting call back. 22:40 - Spoke with Dr. Rina Keyes and informed him that the xray said the the PICC line tip was in the SVC and did that mean that the it was ok to use. He indicated that it was.

## 2023-12-18 NOTE — PROGRESS NOTES
[facility]                                                                               Hospitalist Progress Note  Linda Muñiz MD          Date of Service:  2023  NAME:  Neha Reyes  :  1975  MRN:  [id number]    Please note that this dictation was completed with [employer], the computer voice recognition software. Quite often unanticipated grammatical, syntax, homophones, and other interpretive errors are inadvertently transcribed by the computer software. Please disregard these errors. Please excuse any errors that have escaped final proofreading. Admission Summary:    46 y.o. female who presented with right-sided abdominal pain that started in the middle of the night. She also reports nausea but has not had any vomiting. She has an ileostomy since having a  at [facility] in August complicated by bladder injury, enterocutaneous fistula and multiple pelvic abscesses. She now has an ileostomy. She was discharged at that time on TPN and currently is still on TPN. In the ED she was found to have acute cholecystitis. Interval history / Subjective:   Patient reports ongoing pain in right side of abdomen. Concerned that TPN might of contributed to cholecystitis. Assessment & Plan:       Anticipated discharge date : TBD  Anticipated disposition : Home  Barriers to discharge : Medical stability     Acute abdominal pain  Cholecystolithiasis with acute cholecystitis  CT abdomen pelvis shows gallbladder wall thickening. Discussed CT results with general surgeon and interventional radiologist.  There are loops of small bowel around the gallbladder which could be the source of thickening. MRCP showed cholecystolithiasis with acute cholecystitis no choledocholithiasis or biliary ductal dilatation.     -Continue meropenem  - Continue Dilaudid PCA. Basal rate  0.5 mg/hour, bolus 0.25 mg every 10 minutes.   - Plan for placement of

## 2023-12-18 NOTE — PROGRESS NOTES
1924: TPN due, however PICC line cannot be accessed due to pending blood cultures. MD made aware and ordered to hold.

## 2023-12-18 NOTE — WOUND CARE
Wound consult: new patient consult for midline abdominal enteric fistula. Patient has her spouse at bedside and infant son. They are followed by [facility] and home health for fistula management. Assessment:  Midline abdominal fistula - small red, slightly budded enteric fistula with large amount of bilious enteric output leaking. Small pink chaffed area in right peristomal area. Treatment:  Used stoma powder and no sting barrier to surrounding skin. Barrier ring to folds/creases and around opening. Used one piece Coloplast appliance. Recommendations:  Empty when 1/3 to no more than 1/2 full to avoid leaks. Patient uses stoma powder / paste to folds at home and 4\" ring ([place]) - we can use stoma powder/paste combo but do not have 4\" ostomy ring. Plan: Will check on patient tomorrow.   Pratik Baker RN, [facility]

## 2023-12-19 LAB
ALBUMIN SERPL-MCNC: 2.7 G/DL (ref 3.5–5)
ALBUMIN/GLOB SERPL: 0.6 (ref 1.1–2.2)
ALP SERPL-CCNC: 122 U/L (ref 45–117)
ALT SERPL-CCNC: 31 U/L (ref 12–78)
ANION GAP SERPL CALC-SCNC: 5 MMOL/L (ref 5–15)
AST SERPL-CCNC: 20 U/L (ref 15–37)
BILIRUB DIRECT SERPL-MCNC: 0.2 MG/DL (ref 0–0.2)
BILIRUB SERPL-MCNC: 0.7 MG/DL (ref 0.2–1)
BUN SERPL-MCNC: 19 MG/DL (ref 6–20)
BUN/CREAT SERPL: 27 (ref 12–20)
CALCIUM SERPL-MCNC: 8.8 MG/DL (ref 8.5–10.1)
CHLORIDE SERPL-SCNC: 104 MMOL/L (ref 97–108)
CO2 SERPL-SCNC: 26 MMOL/L (ref 21–32)
CREAT SERPL-MCNC: 0.7 MG/DL (ref 0.55–1.02)
GLOBULIN SER CALC-MCNC: 4.4 G/DL (ref 2–4)
GLUCOSE SERPL-MCNC: 157 MG/DL (ref 65–100)
MAGNESIUM SERPL-MCNC: 2.3 MG/DL (ref 1.6–2.4)
PHOSPHATE SERPL-MCNC: 1.9 MG/DL (ref 2.6–4.7)
POTASSIUM SERPL-SCNC: 3.7 MMOL/L (ref 3.5–5.1)
PROT SERPL-MCNC: 7.1 G/DL (ref 6.4–8.2)
SODIUM SERPL-SCNC: 135 MMOL/L (ref 136–145)
TRIGL SERPL-MCNC: 89 MG/DL

## 2023-12-19 PROCEDURE — 36415 COLL VENOUS BLD VENIPUNCTURE: CPT

## 2023-12-19 PROCEDURE — 84478 ASSAY OF TRIGLYCERIDES: CPT

## 2023-12-19 PROCEDURE — 6370000000 HC RX 637 (ALT 250 FOR IP): Performed by: STUDENT IN AN ORGANIZED HEALTH CARE EDUCATION/TRAINING PROGRAM

## 2023-12-19 PROCEDURE — 2580000003 HC RX 258: Performed by: STUDENT IN AN ORGANIZED HEALTH CARE EDUCATION/TRAINING PROGRAM

## 2023-12-19 PROCEDURE — 84100 ASSAY OF PHOSPHORUS: CPT

## 2023-12-19 PROCEDURE — 83735 ASSAY OF MAGNESIUM: CPT

## 2023-12-19 PROCEDURE — 6360000002 HC RX W HCPCS: Performed by: STUDENT IN AN ORGANIZED HEALTH CARE EDUCATION/TRAINING PROGRAM

## 2023-12-19 PROCEDURE — 2500000003 HC RX 250 WO HCPCS: Performed by: STUDENT IN AN ORGANIZED HEALTH CARE EDUCATION/TRAINING PROGRAM

## 2023-12-19 PROCEDURE — 1100000000 HC RM PRIVATE

## 2023-12-19 PROCEDURE — 6360000002 HC RX W HCPCS: Performed by: FAMILY MEDICINE

## 2023-12-19 PROCEDURE — 80048 BASIC METABOLIC PNL TOTAL CA: CPT

## 2023-12-19 PROCEDURE — 80076 HEPATIC FUNCTION PANEL: CPT

## 2023-12-19 PROCEDURE — 2580000003 HC RX 258: Performed by: FAMILY MEDICINE

## 2023-12-19 PROCEDURE — 6370000000 HC RX 637 (ALT 250 FOR IP): Performed by: FAMILY MEDICINE

## 2023-12-19 PROCEDURE — 94761 N-INVAS EAR/PLS OXIMETRY MLT: CPT

## 2023-12-19 RX ORDER — FENTANYL 12.5 UG/1
1 PATCH TRANSDERMAL
Status: DISCONTINUED | OUTPATIENT
Start: 2023-12-19 | End: 2023-12-22

## 2023-12-19 RX ORDER — OXYCODONE HYDROCHLORIDE 5 MG/1
5 TABLET ORAL EVERY 4 HOURS PRN
Status: DISCONTINUED | OUTPATIENT
Start: 2023-12-19 | End: 2023-12-21

## 2023-12-19 RX ORDER — NALOXONE HYDROCHLORIDE 0.4 MG/ML
0.4 INJECTION, SOLUTION INTRAMUSCULAR; INTRAVENOUS; SUBCUTANEOUS PRN
Status: DISCONTINUED | OUTPATIENT
Start: 2023-12-19 | End: 2023-12-23 | Stop reason: HOSPADM

## 2023-12-19 RX ORDER — IBUPROFEN 800 MG/1
400 TABLET ORAL
Status: DISCONTINUED | OUTPATIENT
Start: 2023-12-19 | End: 2023-12-22

## 2023-12-19 RX ORDER — METRONIDAZOLE 500 MG/100ML
500 INJECTION, SOLUTION INTRAVENOUS EVERY 8 HOURS
Status: DISCONTINUED | OUTPATIENT
Start: 2023-12-19 | End: 2023-12-22

## 2023-12-19 RX ORDER — ACETAMINOPHEN 500 MG
1000 TABLET ORAL 3 TIMES DAILY
Status: DISCONTINUED | OUTPATIENT
Start: 2023-12-19 | End: 2023-12-23 | Stop reason: HOSPADM

## 2023-12-19 RX ADMIN — OCTREOTIDE ACETATE 100 MCG: 100 INJECTION, SOLUTION INTRAVENOUS; SUBCUTANEOUS at 15:02

## 2023-12-19 RX ADMIN — WATER: 1 INJECTION INTRAMUSCULAR; INTRAVENOUS; SUBCUTANEOUS at 18:42

## 2023-12-19 RX ADMIN — IBUPROFEN 400 MG: 800 TABLET, FILM COATED ORAL at 22:31

## 2023-12-19 RX ADMIN — Medication: at 20:05

## 2023-12-19 RX ADMIN — Medication: at 19:40

## 2023-12-19 RX ADMIN — SODIUM PHOSPHATE, MONOBASIC, MONOHYDRATE AND SODIUM PHOSPHATE, DIBASIC, ANHYDROUS 30 MMOL: 142; 276 INJECTION, SOLUTION INTRAVENOUS at 10:29

## 2023-12-19 RX ADMIN — ENOXAPARIN SODIUM 40 MG: 100 INJECTION SUBCUTANEOUS at 22:33

## 2023-12-19 RX ADMIN — OCTREOTIDE ACETATE 100 MCG: 100 INJECTION, SOLUTION INTRAVENOUS; SUBCUTANEOUS at 22:33

## 2023-12-19 RX ADMIN — I.V. FAT EMULSION 250 ML: 20 EMULSION INTRAVENOUS at 18:50

## 2023-12-19 RX ADMIN — ACETAMINOPHEN 1000 MG: 500 TABLET ORAL at 22:31

## 2023-12-19 RX ADMIN — OCTREOTIDE ACETATE 100 MCG: 100 INJECTION, SOLUTION INTRAVENOUS; SUBCUTANEOUS at 06:55

## 2023-12-19 RX ADMIN — MEROPENEM 1000 MG: 1 INJECTION, POWDER, FOR SOLUTION INTRAVENOUS at 00:19

## 2023-12-19 RX ADMIN — SODIUM CHLORIDE, PRESERVATIVE FREE 10 ML: 5 INJECTION INTRAVENOUS at 22:33

## 2023-12-19 RX ADMIN — BISACODYL 5 MG: 5 TABLET, COATED ORAL at 08:15

## 2023-12-19 RX ADMIN — SODIUM CHLORIDE, PRESERVATIVE FREE 10 ML: 5 INJECTION INTRAVENOUS at 08:18

## 2023-12-19 RX ADMIN — METRONIDAZOLE 500 MG: 500 INJECTION, SOLUTION INTRAVENOUS at 16:41

## 2023-12-19 RX ADMIN — CEFEPIME 2000 MG: 2 INJECTION, POWDER, FOR SOLUTION INTRAVENOUS at 16:42

## 2023-12-19 RX ADMIN — MEROPENEM 1000 MG: 1 INJECTION, POWDER, FOR SOLUTION INTRAVENOUS at 08:21

## 2023-12-19 RX ADMIN — Medication: at 04:24

## 2023-12-19 NOTE — PROGRESS NOTES
Occupational Therapy Note:OT attempted however pt feeding her baby stating he got shots today. She stated she gets up to the restroom with spouse assist. Will cont to follow.

## 2023-12-19 NOTE — PROGRESS NOTES
Comprehensive Nutrition Assessment    Type and Reason for Visit:  Initial    Nutrition Recommendations/Plan:   Advance diet: Clear liquids  - Do not suspect patient will consume >50% of trays   Continue home cyclic TPN:  7697 mL, 130 g Dextrose, 135 g AA x 14 hours   Hours 1 & 14 69.2 mL/hour  Hours 2 - 13 138.5 mL/hour  Daily intralipids 20% 250 mL   Replete phos  Checking TG      Malnutrition Assessment:  Malnutrition Status: At risk for malnutrition (Comment) (12/19/23 1157)  - long term TPN  Context:  Acute Illness     Findings of the 6 clinical characteristics of malnutrition:  Energy Intake:  No significant decrease in energy intake  Weight Loss:  No significant weight loss     Body Fat Loss:  No significant body fat loss     Muscle Mass Loss:  Mild muscle mass loss Clavicles (pectoralis & deltoids), Scapula (trapezius)  Fluid Accumulation:  No significant fluid accumulation     Strength:  Not Performed    Nutrition Assessment:     Patient is a 46year old female admitted with Acute cholecystitis [K81.0]. She  has a past medical history of Anxiety, Bowel obstruction ([address]), Breast cancer ([address]), Cancer ([address]), Depression, FH: breast cancer, GERD (gastroesophageal reflux disease), Glaucoma, Headache, Hypersomnolent, Joint pain, Leg swelling, Muscle weakness, Other ill-defined conditions(799.89), Radiation therapy complication, Rectal fissure, Uterine polyp, and Visual disturbance. Patient on home TPN; tolerating well per her report. States she began TPN in August 2023, but has been on cyclic home TPN since October 9160 (~2 months cyclic, 4 months total TPN). States she drinks sips of clear liquids, takes ice chips, some broth by mouth. No nausea/vomiting/diarrhea at this time. C/o some abd pain. Checking TG today per PharmD. Discussed with surgery NP, okay to advance diet to clear liquids today. Reports ostomy output 'stable' today. Repleting phos.  Patient states # and states weight has been stable

## 2023-12-19 NOTE — WOUND CARE
Wound visit: no leaking currently from under wafer for midline abdominal fistula; minimal green bilious output in appliance.   Wayne Mckeon RN, [employer]

## 2023-12-19 NOTE — CARE COORDINATION
12/19/23  Care Management Assessment      Reason for Admission:     Acute cholecystitis [K81.0]         RUR: Readmission Risk              Risk of Unplanned Readmission:  12         Advance Directive: Full Code     [x] No AD on file. Healthcare Decision Maker: UTB    Assessment:     12/19/23 1404   Service Assessment   Patient Orientation Alert and Oriented   Cognition Alert   Primary Caregiver Self   Support Systems Spouse/Significant Other   Patient's Healthcare Decision Maker is: Legal Next of Kin   Prior Functional Level Mobility  (purchased a rollator)   Current Functional Level Assistance with the following:;Mobility   Can patient return to prior living arrangement Yes   Ability to make needs known: Good   Family able to assist with home care needs: Yes   Financial Resources Other (Comment)  ()   Social/Functional History   Lives With Spouse   Type of [facility]  Two level  (main floor bedroom and bathroom)   [address] to enter with rails   Entrance Stairs - Number of Steps 5 steps   Entrance Stairs - Rails Both   Bathroom Shower/Tub Tub/Shower unit   [address] Help From Family   ADL Assistance Needs assistance   Bath Modified independent   Dressing Modified independent   Grooming Independent   Feeding Independent   Toileting Independent   Ambulation Assistance Needs assistance   Transfer Assistance Independent   Active  No   Mode of Transportation Car   Occupation Full time employment   Discharge Planning   Type of Residence House   Living Arrangements Spouse/Significant Other  ( and 2 month old baby)   Services At/After Discharge   Transition of [address] [place] [address]  ([address] [place] [address]) [address] [place] [address] Discharge Home Health         Insurer:    Active Insurance as of 12/16/2023       Primary Coverage       [address]        Payor Plan Address Payor

## 2023-12-19 NOTE — PROGRESS NOTES
[facility]                                                                               Hospitalist Progress Note  Katy Freire MD          Date of Service:  2023  NAME:  Maki Rhoades  :  1975  MRN:  [id number]    Please note that this dictation was completed with [employer], the computer voice recognition software. Quite often unanticipated grammatical, syntax, homophones, and other interpretive errors are inadvertently transcribed by the computer software. Please disregard these errors. Please excuse any errors that have escaped final proofreading. Admission Summary:   46 y.o. female who presented with right-sided abdominal pain that started in the middle of the night. She also reports nausea but has not had any vomiting. She has an ileostomy since having a  at [facility] in August complicated by bladder injury, enterocutaneous fistula and multiple pelvic abscesses. She now has an ileostomy. She was discharged at that time on TPN and currently is still on TPN. In the ED she was found to have acute cholecystitis. Interval history / Subjective:   Patient continues to have right upper quadrant pain. States that her Dilaudid PCA makes her go to sleep but does not really address underlying pain. No fevers or chills. Patient's  has taken their 3month-old baby to pediatrician appointment     Assessment & Plan:     Anticipated discharge date : 2 to 3 days  Anticipated disposition : Home  Barriers to discharge : Medical stability         Acute abdominal pain due to  Cholecystolithiasis with acute cholecystitis  CT abdomen pelvis shows gallbladder wall thickening. Discussed CT results with general surgeon and interventional radiologist.  There are loops of small bowel around the gallbladder which could be the source of thickening.   MRCP showed cholecystolithiasis with acute cholecystitis no choledocholithiasis or biliary ductal dilatation.     - Antibiotics switched to switch to cefepime and Flagyl. Patient has history of ESBL UTI. Preliminary body fluid culture during percutaneous cholecystostomy tube placement showing no growth so far  - Continue Dilaudid PCA. Basal rate  0.5 mg/hour, bolus 0.25 mg every 10 minutes. - Status post percutaneous cholecystostomy tube on 12/18  - General surgery following, start clear liquid diet per their recommendations. - Continue TPN     History of ostomy  -Continue ostomy care  -Consult wound care/ostomy nurse  -Continue TPN     Hypertension  -She has a history of gestational hypertension which improved after delivery  Suspect current elevation in BP due to pain. - Will hold off on antihypertensives  - Continue pain management     GERD  -Continue PPI        Code status: Full code  Prophylaxis: Lovenox  Care Plan discussed with: Patient, infectious disease physician, general surgery NP,  nurse, pharmacist  Anticipated Disposition:   Inpatient  Cardiac monitoring: remote tele              Social Determinants of Health     Tobacco Use: Low Risk  (12/19/2023)    Patient History     Smoking Tobacco Use: Never     Smokeless Tobacco Use: Never     Passive Exposure: Not on file   Alcohol Use: Not on file   Financial Resource Strain: Not on file   Food Insecurity: No Food Insecurity (12/16/2023)    Hunger Vital Sign     Worried About Running Out of Food in the Last Year: Never true     [address] in the Last Year: Never true   Transportation Needs: No Transportation Needs (12/16/2023)    PRAPARE - Transportation     Lack of Transportation (Medical): No     Lack of Transportation (Non-Medical):  No   Physical Activity: Not on file   Stress: Not on file   Social Connections: Not on file   Intimate Partner Violence: Not on file   Depression: Not on file   Housing Stability: Low Risk  (12/16/2023)    Housing Stability Vital Sign     Unable to Pay for Housing in the Last Year: No     Number of

## 2023-12-20 PROBLEM — Z86.19 HISTORY OF ESBL KLEBSIELLA PNEUMONIAE INFECTION: Status: ACTIVE | Noted: 2023-12-20

## 2023-12-20 PROBLEM — D72.829 LEUKOCYTOSIS: Status: ACTIVE | Noted: 2023-12-20

## 2023-12-20 LAB
ANION GAP SERPL CALC-SCNC: 5 MMOL/L (ref 5–15)
ANION GAP SERPL CALC-SCNC: 5 MMOL/L (ref 5–15)
BASOPHILS # BLD: 0 K/UL (ref 0–0.1)
BASOPHILS NFR BLD: 0 % (ref 0–1)
BUN SERPL-MCNC: 18 MG/DL (ref 6–20)
BUN SERPL-MCNC: 18 MG/DL (ref 6–20)
BUN/CREAT SERPL: 27 (ref 12–20)
BUN/CREAT SERPL: 28 (ref 12–20)
CALCIUM SERPL-MCNC: 8.4 MG/DL (ref 8.5–10.1)
CALCIUM SERPL-MCNC: 8.5 MG/DL (ref 8.5–10.1)
CHLORIDE SERPL-SCNC: 107 MMOL/L (ref 97–108)
CHLORIDE SERPL-SCNC: 107 MMOL/L (ref 97–108)
CO2 SERPL-SCNC: 26 MMOL/L (ref 21–32)
CO2 SERPL-SCNC: 26 MMOL/L (ref 21–32)
CREAT SERPL-MCNC: 0.65 MG/DL (ref 0.55–1.02)
CREAT SERPL-MCNC: 0.66 MG/DL (ref 0.55–1.02)
DIFFERENTIAL METHOD BLD: ABNORMAL
EOSINOPHIL # BLD: 0.1 K/UL (ref 0–0.4)
EOSINOPHIL NFR BLD: 1 % (ref 0–7)
ERYTHROCYTE [DISTWIDTH] IN BLOOD BY AUTOMATED COUNT: 15.4 % (ref 11.5–14.5)
GLUCOSE SERPL-MCNC: 135 MG/DL (ref 65–100)
GLUCOSE SERPL-MCNC: 138 MG/DL (ref 65–100)
HCT VFR BLD AUTO: 31.1 % (ref 35–47)
HGB BLD-MCNC: 10.2 G/DL (ref 11.5–16)
IMM GRANULOCYTES # BLD AUTO: 0 K/UL (ref 0–0.04)
IMM GRANULOCYTES NFR BLD AUTO: 0 % (ref 0–0.5)
LYMPHOCYTES # BLD: 1.8 K/UL (ref 0.8–3.5)
LYMPHOCYTES NFR BLD: 18 % (ref 12–49)
MAGNESIUM SERPL-MCNC: 2.4 MG/DL (ref 1.6–2.4)
MAGNESIUM SERPL-MCNC: 2.5 MG/DL (ref 1.6–2.4)
MCH RBC QN AUTO: 23 PG (ref 26–34)
MCHC RBC AUTO-ENTMCNC: 32.8 G/DL (ref 30–36.5)
MCV RBC AUTO: 70 FL (ref 80–99)
MONOCYTES # BLD: 0.6 K/UL (ref 0–1)
MONOCYTES NFR BLD: 7 % (ref 5–13)
NEUTS SEG # BLD: 7.1 K/UL (ref 1.8–8)
NEUTS SEG NFR BLD: 73 % (ref 32–75)
NRBC # BLD: 0 K/UL (ref 0–0.01)
NRBC BLD-RTO: 0 PER 100 WBC
PHOSPHATE SERPL-MCNC: 2.4 MG/DL (ref 2.6–4.7)
PHOSPHATE SERPL-MCNC: 2.4 MG/DL (ref 2.6–4.7)
PLATELET # BLD AUTO: 142 K/UL (ref 150–400)
POTASSIUM SERPL-SCNC: 3.5 MMOL/L (ref 3.5–5.1)
POTASSIUM SERPL-SCNC: 3.6 MMOL/L (ref 3.5–5.1)
RBC # BLD AUTO: 4.44 M/UL (ref 3.8–5.2)
SODIUM SERPL-SCNC: 138 MMOL/L (ref 136–145)
SODIUM SERPL-SCNC: 138 MMOL/L (ref 136–145)
WBC # BLD AUTO: 9.6 K/UL (ref 3.6–11)

## 2023-12-20 PROCEDURE — 83735 ASSAY OF MAGNESIUM: CPT

## 2023-12-20 PROCEDURE — 2580000003 HC RX 258: Performed by: STUDENT IN AN ORGANIZED HEALTH CARE EDUCATION/TRAINING PROGRAM

## 2023-12-20 PROCEDURE — 2580000003 HC RX 258: Performed by: FAMILY MEDICINE

## 2023-12-20 PROCEDURE — 1100000000 HC RM PRIVATE

## 2023-12-20 PROCEDURE — 6360000002 HC RX W HCPCS: Performed by: FAMILY MEDICINE

## 2023-12-20 PROCEDURE — 6360000002 HC RX W HCPCS: Performed by: STUDENT IN AN ORGANIZED HEALTH CARE EDUCATION/TRAINING PROGRAM

## 2023-12-20 PROCEDURE — 94761 N-INVAS EAR/PLS OXIMETRY MLT: CPT

## 2023-12-20 PROCEDURE — 6370000000 HC RX 637 (ALT 250 FOR IP): Performed by: FAMILY MEDICINE

## 2023-12-20 PROCEDURE — 80048 BASIC METABOLIC PNL TOTAL CA: CPT

## 2023-12-20 PROCEDURE — 84100 ASSAY OF PHOSPHORUS: CPT

## 2023-12-20 PROCEDURE — 6370000000 HC RX 637 (ALT 250 FOR IP): Performed by: STUDENT IN AN ORGANIZED HEALTH CARE EDUCATION/TRAINING PROGRAM

## 2023-12-20 PROCEDURE — 85025 COMPLETE CBC W/AUTO DIFF WBC: CPT

## 2023-12-20 PROCEDURE — 36415 COLL VENOUS BLD VENIPUNCTURE: CPT

## 2023-12-20 PROCEDURE — 2500000003 HC RX 250 WO HCPCS: Performed by: STUDENT IN AN ORGANIZED HEALTH CARE EDUCATION/TRAINING PROGRAM

## 2023-12-20 RX ADMIN — ACETAMINOPHEN 1000 MG: 500 TABLET ORAL at 14:43

## 2023-12-20 RX ADMIN — METRONIDAZOLE 500 MG: 500 INJECTION, SOLUTION INTRAVENOUS at 00:34

## 2023-12-20 RX ADMIN — METRONIDAZOLE 500 MG: 500 INJECTION, SOLUTION INTRAVENOUS at 08:48

## 2023-12-20 RX ADMIN — CEFEPIME 2000 MG: 2 INJECTION, POWDER, FOR SOLUTION INTRAVENOUS at 16:20

## 2023-12-20 RX ADMIN — IBUPROFEN 400 MG: 800 TABLET, FILM COATED ORAL at 18:19

## 2023-12-20 RX ADMIN — CEFEPIME 2000 MG: 2 INJECTION, POWDER, FOR SOLUTION INTRAVENOUS at 08:47

## 2023-12-20 RX ADMIN — IBUPROFEN 400 MG: 800 TABLET, FILM COATED ORAL at 12:35

## 2023-12-20 RX ADMIN — SODIUM CHLORIDE, PRESERVATIVE FREE 10 ML: 5 INJECTION INTRAVENOUS at 20:09

## 2023-12-20 RX ADMIN — ACETAMINOPHEN 1000 MG: 500 TABLET ORAL at 20:09

## 2023-12-20 RX ADMIN — BISACODYL 5 MG: 5 TABLET, COATED ORAL at 08:43

## 2023-12-20 RX ADMIN — SODIUM CHLORIDE, PRESERVATIVE FREE 10 ML: 5 INJECTION INTRAVENOUS at 08:49

## 2023-12-20 RX ADMIN — METRONIDAZOLE 500 MG: 500 INJECTION, SOLUTION INTRAVENOUS at 16:20

## 2023-12-20 RX ADMIN — OXYCODONE HYDROCHLORIDE 5 MG: 5 TABLET ORAL at 00:52

## 2023-12-20 RX ADMIN — I.V. FAT EMULSION 250 ML: 20 EMULSION INTRAVENOUS at 18:19

## 2023-12-20 RX ADMIN — ACETAMINOPHEN 1000 MG: 500 TABLET ORAL at 08:43

## 2023-12-20 RX ADMIN — WATER: 1 INJECTION INTRAMUSCULAR; INTRAVENOUS; SUBCUTANEOUS at 18:13

## 2023-12-20 RX ADMIN — OXYCODONE HYDROCHLORIDE 5 MG: 5 TABLET ORAL at 13:03

## 2023-12-20 RX ADMIN — OXYCODONE HYDROCHLORIDE 5 MG: 5 TABLET ORAL at 06:56

## 2023-12-20 RX ADMIN — OCTREOTIDE ACETATE 100 MCG: 100 INJECTION, SOLUTION INTRAVENOUS; SUBCUTANEOUS at 14:42

## 2023-12-20 RX ADMIN — OCTREOTIDE ACETATE 100 MCG: 100 INJECTION, SOLUTION INTRAVENOUS; SUBCUTANEOUS at 06:46

## 2023-12-20 RX ADMIN — ENOXAPARIN SODIUM 40 MG: 100 INJECTION SUBCUTANEOUS at 20:09

## 2023-12-20 RX ADMIN — OXYCODONE HYDROCHLORIDE 5 MG: 5 TABLET ORAL at 20:08

## 2023-12-20 RX ADMIN — OCTREOTIDE ACETATE 100 MCG: 100 INJECTION, SOLUTION INTRAVENOUS; SUBCUTANEOUS at 20:09

## 2023-12-20 RX ADMIN — CEFEPIME 2000 MG: 2 INJECTION, POWDER, FOR SOLUTION INTRAVENOUS at 00:33

## 2023-12-20 RX ADMIN — IBUPROFEN 400 MG: 800 TABLET, FILM COATED ORAL at 08:43

## 2023-12-20 NOTE — PROGRESS NOTES
(SANDOSTATIN) injection 100 mcg  100 mcg SubCUTAneous Q8H    fat emulsion (INTRALIPID/NUTRILIPID) 20 % infusion 250 mL  250 mL IntraVENous Daily    gadoteridol (PROHANCE) injection 17 mL  17 mL IntraVENous ONCE PRN    lactated ringers bolus bolus 1,000 mL  1,000 mL IntraVENous Once    sodium chloride flush 0.9 % injection 5-40 mL  5-40 mL IntraVENous 2 times per day    sodium chloride flush 0.9 % injection 5-40 mL  5-40 mL IntraVENous PRN    0.9 % sodium chloride infusion   IntraVENous PRN    enoxaparin (LOVENOX) injection 40 mg  40 mg SubCUTAneous Q24H    ondansetron (ZOFRAN-ODT) disintegrating tablet 4 mg  4 mg Oral Q8H PRN    Or    ondansetron (ZOFRAN) injection 4 mg  4 mg IntraVENous Q6H PRN    acetaminophen (TYLENOL) tablet 650 mg  650 mg Oral Q6H PRN    Or    acetaminophen (TYLENOL) suppository 650 mg  650 mg Rectal Q6H PRN    bisacodyl (DULCOLAX) EC tablet 5 mg  5 mg Oral Daily     ______________________________________________________________________  EXPECTED LENGTH OF STAY: 5  ACTUAL LENGTH OF STAY:          4                 Darren Blanton MD

## 2023-12-20 NOTE — PROGRESS NOTES
Called into patient room to discuss pain management. Patient states dilaudid pca is making her sleepy and asking if she can be switched to a fentanyl patch. Reviewed previous records notably Dr Darrel Ochoa clinic notes. Patient was on fentanyl patch 75 mcg/hr along with roxicodone 5 mg every 4 hours for severe pain in post-op setting. Will start low dose fentanyl patch 12 mcg/hr for 72 hours   Prn oxycodone oral 5 mg every 4 hours for severe pain   Scheduled tylenol 1000 mg TID  Scheduled ibuprofen 400 mg TID (patient had body aches with toradol but has tolerated ibuprofen in the past), will uptitrate as able. Dilaudid PCA discontinued    Discussed risks of co-sleeping with infant or having infant sleep on her while on sedating medications and increased risk of SIDS and suffocation. Advised infant sleep in bedside bassinet and only have baby in bed with her when she is fully alert and awake.    Patient verbalized understanding

## 2023-12-20 NOTE — PROGRESS NOTES
Physical therapy    1400 Pt received in bed, infant at her side, Pt report up to bathroom with assist from staff or spouse. Declining OOB activity at this time. Provide supportive listening as patient shared her story. Encouraged OOB as tolerated and short, frequent bouts of activity.

## 2023-12-20 NOTE — WOUND CARE
Wound Consult:  follow up Visit. Spoke with patients nurse,  Delbert Dhaliwal. Patient is resting on a palomo bed with KAYCEE mattress. .  Patient is awake, alert, cooperative. Assessment:  Lower midline abdominal wound/fistula- red, pink,moist, moderate amount of maroon/brownish, with slight bloody drainage close to fistula area thick drainage, no odor, grace-wound- clean no open areas or redness. Treatment:  ostomy appliance chanted with paste and powder, ring and wafer surrounding wafer. Plan: We will continue to reassess  and as needed.     [facility], Wound / [address] [phone]

## 2023-12-21 ENCOUNTER — APPOINTMENT (OUTPATIENT)
Facility: HOSPITAL | Age: 48
DRG: 445 | End: 2023-12-21
Payer: OTHER GOVERNMENT

## 2023-12-21 LAB
ALBUMIN SERPL-MCNC: 2.6 G/DL (ref 3.5–5)
ALBUMIN/GLOB SERPL: 0.6 (ref 1.1–2.2)
ALP SERPL-CCNC: 105 U/L (ref 45–117)
ALT SERPL-CCNC: 25 U/L (ref 12–78)
ANION GAP SERPL CALC-SCNC: 6 MMOL/L (ref 5–15)
AST SERPL-CCNC: 18 U/L (ref 15–37)
BASOPHILS # BLD: 0 K/UL (ref 0–0.1)
BASOPHILS NFR BLD: 1 % (ref 0–1)
BILIRUB DIRECT SERPL-MCNC: 0.1 MG/DL (ref 0–0.2)
BILIRUB SERPL-MCNC: 0.3 MG/DL (ref 0.2–1)
BUN SERPL-MCNC: 17 MG/DL (ref 6–20)
BUN/CREAT SERPL: 27 (ref 12–20)
CALCIUM SERPL-MCNC: 8.5 MG/DL (ref 8.5–10.1)
CHLORIDE SERPL-SCNC: 110 MMOL/L (ref 97–108)
CO2 SERPL-SCNC: 26 MMOL/L (ref 21–32)
CREAT SERPL-MCNC: 0.64 MG/DL (ref 0.55–1.02)
DIFFERENTIAL METHOD BLD: ABNORMAL
EOSINOPHIL # BLD: 0.1 K/UL (ref 0–0.4)
EOSINOPHIL NFR BLD: 3 % (ref 0–7)
ERYTHROCYTE [DISTWIDTH] IN BLOOD BY AUTOMATED COUNT: 14.9 % (ref 11.5–14.5)
GLOBULIN SER CALC-MCNC: 4.1 G/DL (ref 2–4)
GLUCOSE SERPL-MCNC: 131 MG/DL (ref 65–100)
HCT VFR BLD AUTO: 31.5 % (ref 35–47)
HGB BLD-MCNC: 10 G/DL (ref 11.5–16)
IMM GRANULOCYTES # BLD AUTO: 0 K/UL (ref 0–0.04)
IMM GRANULOCYTES NFR BLD AUTO: 1 % (ref 0–0.5)
LYMPHOCYTES # BLD: 1.6 K/UL (ref 0.8–3.5)
LYMPHOCYTES NFR BLD: 36 % (ref 12–49)
MAGNESIUM SERPL-MCNC: 2.3 MG/DL (ref 1.6–2.4)
MCH RBC QN AUTO: 22.4 PG (ref 26–34)
MCHC RBC AUTO-ENTMCNC: 31.7 G/DL (ref 30–36.5)
MCV RBC AUTO: 70.6 FL (ref 80–99)
MONOCYTES # BLD: 0.3 K/UL (ref 0–1)
MONOCYTES NFR BLD: 6 % (ref 5–13)
NEUTS SEG # BLD: 2.4 K/UL (ref 1.8–8)
NEUTS SEG NFR BLD: 53 % (ref 32–75)
NRBC # BLD: 0 K/UL (ref 0–0.01)
NRBC BLD-RTO: 0 PER 100 WBC
PHOSPHATE SERPL-MCNC: 3.2 MG/DL (ref 2.6–4.7)
PLATELET # BLD AUTO: 158 K/UL (ref 150–400)
POTASSIUM SERPL-SCNC: 3.5 MMOL/L (ref 3.5–5.1)
PROT SERPL-MCNC: 6.7 G/DL (ref 6.4–8.2)
RBC # BLD AUTO: 4.46 M/UL (ref 3.8–5.2)
SODIUM SERPL-SCNC: 142 MMOL/L (ref 136–145)
WBC # BLD AUTO: 4.5 K/UL (ref 3.6–11)

## 2023-12-21 PROCEDURE — 6360000002 HC RX W HCPCS: Performed by: FAMILY MEDICINE

## 2023-12-21 PROCEDURE — 94761 N-INVAS EAR/PLS OXIMETRY MLT: CPT

## 2023-12-21 PROCEDURE — 80048 BASIC METABOLIC PNL TOTAL CA: CPT

## 2023-12-21 PROCEDURE — 6360000002 HC RX W HCPCS: Performed by: STUDENT IN AN ORGANIZED HEALTH CARE EDUCATION/TRAINING PROGRAM

## 2023-12-21 PROCEDURE — 1100000000 HC RM PRIVATE

## 2023-12-21 PROCEDURE — 84100 ASSAY OF PHOSPHORUS: CPT

## 2023-12-21 PROCEDURE — 2500000003 HC RX 250 WO HCPCS: Performed by: STUDENT IN AN ORGANIZED HEALTH CARE EDUCATION/TRAINING PROGRAM

## 2023-12-21 PROCEDURE — 2580000003 HC RX 258: Performed by: STUDENT IN AN ORGANIZED HEALTH CARE EDUCATION/TRAINING PROGRAM

## 2023-12-21 PROCEDURE — 6370000000 HC RX 637 (ALT 250 FOR IP): Performed by: STUDENT IN AN ORGANIZED HEALTH CARE EDUCATION/TRAINING PROGRAM

## 2023-12-21 PROCEDURE — 36415 COLL VENOUS BLD VENIPUNCTURE: CPT

## 2023-12-21 PROCEDURE — 74177 CT ABD & PELVIS W/CONTRAST: CPT

## 2023-12-21 PROCEDURE — 80076 HEPATIC FUNCTION PANEL: CPT

## 2023-12-21 PROCEDURE — 85025 COMPLETE CBC W/AUTO DIFF WBC: CPT

## 2023-12-21 PROCEDURE — 6360000004 HC RX CONTRAST MEDICATION: Performed by: STUDENT IN AN ORGANIZED HEALTH CARE EDUCATION/TRAINING PROGRAM

## 2023-12-21 PROCEDURE — 2580000003 HC RX 258: Performed by: FAMILY MEDICINE

## 2023-12-21 PROCEDURE — 83735 ASSAY OF MAGNESIUM: CPT

## 2023-12-21 RX ORDER — HYDROMORPHONE HYDROCHLORIDE 1 MG/ML
1 INJECTION, SOLUTION INTRAMUSCULAR; INTRAVENOUS; SUBCUTANEOUS EVERY 4 HOURS PRN
Status: DISCONTINUED | OUTPATIENT
Start: 2023-12-21 | End: 2023-12-22

## 2023-12-21 RX ORDER — HYDROMORPHONE HYDROCHLORIDE 1 MG/ML
1 INJECTION, SOLUTION INTRAMUSCULAR; INTRAVENOUS; SUBCUTANEOUS ONCE
Status: COMPLETED | OUTPATIENT
Start: 2023-12-21 | End: 2023-12-21

## 2023-12-21 RX ORDER — OXYCODONE HYDROCHLORIDE 5 MG/1
10 TABLET ORAL EVERY 4 HOURS PRN
Status: DISCONTINUED | OUTPATIENT
Start: 2023-12-21 | End: 2023-12-23 | Stop reason: HOSPADM

## 2023-12-21 RX ADMIN — OXYCODONE HYDROCHLORIDE 5 MG: 5 TABLET ORAL at 10:17

## 2023-12-21 RX ADMIN — SODIUM CHLORIDE, PRESERVATIVE FREE 10 ML: 5 INJECTION INTRAVENOUS at 20:39

## 2023-12-21 RX ADMIN — HYDROMORPHONE HYDROCHLORIDE 1 MG: 1 INJECTION, SOLUTION INTRAMUSCULAR; INTRAVENOUS; SUBCUTANEOUS at 20:31

## 2023-12-21 RX ADMIN — METRONIDAZOLE 500 MG: 500 INJECTION, SOLUTION INTRAVENOUS at 23:19

## 2023-12-21 RX ADMIN — ENOXAPARIN SODIUM 40 MG: 100 INJECTION SUBCUTANEOUS at 20:30

## 2023-12-21 RX ADMIN — WATER: 1 INJECTION INTRAMUSCULAR; INTRAVENOUS; SUBCUTANEOUS at 19:00

## 2023-12-21 RX ADMIN — OCTREOTIDE ACETATE 100 MCG: 100 INJECTION, SOLUTION INTRAVENOUS; SUBCUTANEOUS at 13:17

## 2023-12-21 RX ADMIN — METRONIDAZOLE 500 MG: 500 INJECTION, SOLUTION INTRAVENOUS at 00:30

## 2023-12-21 RX ADMIN — CEFEPIME 2000 MG: 2 INJECTION, POWDER, FOR SOLUTION INTRAVENOUS at 17:26

## 2023-12-21 RX ADMIN — CEFEPIME 2000 MG: 2 INJECTION, POWDER, FOR SOLUTION INTRAVENOUS at 00:29

## 2023-12-21 RX ADMIN — CEFEPIME 2000 MG: 2 INJECTION, POWDER, FOR SOLUTION INTRAVENOUS at 10:10

## 2023-12-21 RX ADMIN — METRONIDAZOLE 500 MG: 500 INJECTION, SOLUTION INTRAVENOUS at 17:29

## 2023-12-21 RX ADMIN — I.V. FAT EMULSION 250 ML: 20 EMULSION INTRAVENOUS at 19:14

## 2023-12-21 RX ADMIN — IOPAMIDOL 100 ML: 755 INJECTION, SOLUTION INTRAVENOUS at 14:35

## 2023-12-21 RX ADMIN — OCTREOTIDE ACETATE 100 MCG: 100 INJECTION, SOLUTION INTRAVENOUS; SUBCUTANEOUS at 23:19

## 2023-12-21 RX ADMIN — HYDROMORPHONE HYDROCHLORIDE 1 MG: 1 INJECTION, SOLUTION INTRAMUSCULAR; INTRAVENOUS; SUBCUTANEOUS at 13:16

## 2023-12-21 RX ADMIN — METRONIDAZOLE 500 MG: 500 INJECTION, SOLUTION INTRAVENOUS at 10:16

## 2023-12-21 RX ADMIN — OXYCODONE HYDROCHLORIDE 5 MG: 5 TABLET ORAL at 17:19

## 2023-12-21 RX ADMIN — OCTREOTIDE ACETATE 100 MCG: 100 INJECTION, SOLUTION INTRAVENOUS; SUBCUTANEOUS at 06:55

## 2023-12-21 NOTE — PROGRESS NOTES
Occupational Therapy Note  12/21/2023    OT treatment attempted at [phone] however patient declining at this time stating being in pain (nursing informed and aware) and stating awaiting physician to come see patient.      Thank you,  Lacy Mendez OTR/L

## 2023-12-21 NOTE — PROGRESS NOTES
2:01 PM : received callback from Dr Tony Pickett, patient's surgeon at [facility]. Discussed diagnosis of acute cholecystitis and Percutaneous cholecystostomy tube placement. Dr Tony Pickett does not have availability for follow up until Jan 15th but can get her into the service clinic where it may be a different attending prior to that. Discussed that patient needs follow up within 1 week of discharge.

## 2023-12-21 NOTE — PROGRESS NOTES
Hospitalist Progress Note      NAME:  Mariluz Shock   :  1975  MRM:  [id number]    Date/Time: 2023  3:02 PM           Assessment / Plan:   Hx of  Previous ostomy due to  complicated by fistula man with acute abdominal pain found to have acute cholecystitis status post percutaneous cholecystostomy tube. ID consulted for antibiotic management. On TPN at home. Weaned off Dilaudid PCA now has fentanyl patch      Acute worsening abdominal pain  -Patient has reported acute worsening of her right upper quadrant abdominal pain, patient has reported pain similar to when she first presented to the emergency room  Plan  -Repeat CT abdomen,   +Percutaneous cholecystostomy tube with ongoing gallbladder wall thickening. No biliary dilatation.  + Malrotated bowel and adhesive disease, without mechanical bowel obstruction. +Nonobstructing right nephrolithiasis. -Reconsulted general surgery for further recommendations, home reported that patient has complicated surgical history, Not a good candidate for surgical intervention in house, and patient needs to be transferred back to higher level of care at [facility] the transfer center, and initiated transfer, - Will reach out to patient's surgeon Dr Todd Grimes to provide update. Acute abdominal pain due to  Cholecystolithiasis with acute cholecystitis  On presentation CT abdomen pelvis shows gallbladder wall thickening. General surgeon and interventional radiologist.  There are loops of small bowel around the gallbladder which could be the source of thickening. MRCP showed cholecystolithiasis with acute cholecystitis no choledocholithiasis or biliary ductal dilatation. Plan  - continue cefepime and Flagyl. Patient has history of ESBL UTI.   Preliminary body fluid culture during percutaneous cholecystostomy tube placement   - Status post percutaneous cholecystostomy tube on  ,continue clear liquid diet per their

## 2023-12-22 PROBLEM — D64.9 ANEMIA: Status: ACTIVE | Noted: 2023-12-22

## 2023-12-22 PROBLEM — E66.9 OBESE: Status: ACTIVE | Noted: 2023-12-22

## 2023-12-22 PROBLEM — K21.9 GERD (GASTROESOPHAGEAL REFLUX DISEASE): Status: ACTIVE | Noted: 2023-12-22

## 2023-12-22 PROBLEM — Z93.3 COLOSTOMY IN PLACE (HCC): Status: ACTIVE | Noted: 2023-12-22

## 2023-12-22 PROBLEM — E46 PROTEIN CALORIE MALNUTRITION (HCC): Status: ACTIVE | Noted: 2023-12-22

## 2023-12-22 PROBLEM — E87.6 HYPOKALEMIA: Status: ACTIVE | Noted: 2023-12-22

## 2023-12-22 PROBLEM — Z93.2 ILEOSTOMY IN PLACE (HCC): Status: ACTIVE | Noted: 2023-12-22

## 2023-12-22 PROBLEM — Z93.3 COLOSTOMY IN PLACE (HCC): Status: RESOLVED | Noted: 2023-12-22 | Resolved: 2023-12-22

## 2023-12-22 PROBLEM — F41.9 ANXIETY AND DEPRESSION: Status: ACTIVE | Noted: 2023-12-22

## 2023-12-22 PROBLEM — F11.20 OPIATE DEPENDENCE (HCC): Status: ACTIVE | Noted: 2023-12-22

## 2023-12-22 PROBLEM — E83.39 HYPOPHOSPHATEMIA: Status: ACTIVE | Noted: 2023-12-22

## 2023-12-22 PROBLEM — G89.29 CHRONIC PAIN: Status: ACTIVE | Noted: 2023-12-22

## 2023-12-22 PROBLEM — F32.A ANXIETY AND DEPRESSION: Status: ACTIVE | Noted: 2023-12-22

## 2023-12-22 PROBLEM — R10.9 ABDOMINAL PAIN: Status: ACTIVE | Noted: 2023-12-22

## 2023-12-22 LAB
ANION GAP SERPL CALC-SCNC: 2 MMOL/L (ref 5–15)
BACTERIA SPEC CULT: NORMAL
BUN SERPL-MCNC: 16 MG/DL (ref 6–20)
BUN/CREAT SERPL: 25 (ref 12–20)
CALCIUM SERPL-MCNC: 8.2 MG/DL (ref 8.5–10.1)
CHLORIDE SERPL-SCNC: 111 MMOL/L (ref 97–108)
CO2 SERPL-SCNC: 27 MMOL/L (ref 21–32)
COMMENT:: NORMAL
CREAT SERPL-MCNC: 0.65 MG/DL (ref 0.55–1.02)
GLUCOSE SERPL-MCNC: 173 MG/DL (ref 65–100)
GRAM STN SPEC: NORMAL
GRAM STN SPEC: NORMAL
MAGNESIUM SERPL-MCNC: 2.2 MG/DL (ref 1.6–2.4)
PHOSPHATE SERPL-MCNC: 2.4 MG/DL (ref 2.6–4.7)
POTASSIUM SERPL-SCNC: 3.3 MMOL/L (ref 3.5–5.1)
SERVICE CMNT-IMP: NORMAL
SODIUM SERPL-SCNC: 140 MMOL/L (ref 136–145)
SPECIMEN HOLD: NORMAL

## 2023-12-22 PROCEDURE — 94761 N-INVAS EAR/PLS OXIMETRY MLT: CPT

## 2023-12-22 PROCEDURE — 6370000000 HC RX 637 (ALT 250 FOR IP): Performed by: NURSE PRACTITIONER

## 2023-12-22 PROCEDURE — 6360000002 HC RX W HCPCS: Performed by: INTERNAL MEDICINE

## 2023-12-22 PROCEDURE — 2500000003 HC RX 250 WO HCPCS: Performed by: STUDENT IN AN ORGANIZED HEALTH CARE EDUCATION/TRAINING PROGRAM

## 2023-12-22 PROCEDURE — 2500000003 HC RX 250 WO HCPCS: Performed by: INTERNAL MEDICINE

## 2023-12-22 PROCEDURE — 80048 BASIC METABOLIC PNL TOTAL CA: CPT

## 2023-12-22 PROCEDURE — 2580000003 HC RX 258: Performed by: NURSE PRACTITIONER

## 2023-12-22 PROCEDURE — 84100 ASSAY OF PHOSPHORUS: CPT

## 2023-12-22 PROCEDURE — 2580000003 HC RX 258: Performed by: INTERNAL MEDICINE

## 2023-12-22 PROCEDURE — 6360000002 HC RX W HCPCS: Performed by: STUDENT IN AN ORGANIZED HEALTH CARE EDUCATION/TRAINING PROGRAM

## 2023-12-22 PROCEDURE — 6360000002 HC RX W HCPCS: Performed by: FAMILY MEDICINE

## 2023-12-22 PROCEDURE — 83735 ASSAY OF MAGNESIUM: CPT

## 2023-12-22 PROCEDURE — 1100000000 HC RM PRIVATE

## 2023-12-22 PROCEDURE — 36415 COLL VENOUS BLD VENIPUNCTURE: CPT

## 2023-12-22 PROCEDURE — 2500000003 HC RX 250 WO HCPCS: Performed by: NURSE PRACTITIONER

## 2023-12-22 PROCEDURE — 6370000000 HC RX 637 (ALT 250 FOR IP): Performed by: STUDENT IN AN ORGANIZED HEALTH CARE EDUCATION/TRAINING PROGRAM

## 2023-12-22 PROCEDURE — 6370000000 HC RX 637 (ALT 250 FOR IP): Performed by: INTERNAL MEDICINE

## 2023-12-22 PROCEDURE — 2580000003 HC RX 258: Performed by: STUDENT IN AN ORGANIZED HEALTH CARE EDUCATION/TRAINING PROGRAM

## 2023-12-22 RX ORDER — METRONIDAZOLE 500 MG/1
500 TABLET ORAL EVERY 8 HOURS
Status: DISCONTINUED | OUTPATIENT
Start: 2023-12-22 | End: 2023-12-23 | Stop reason: HOSPADM

## 2023-12-22 RX ORDER — POTASSIUM CHLORIDE 750 MG/1
20 TABLET, FILM COATED, EXTENDED RELEASE ORAL ONCE
Status: COMPLETED | OUTPATIENT
Start: 2023-12-22 | End: 2023-12-22

## 2023-12-22 RX ORDER — FENTANYL 25 UG/1
1 PATCH TRANSDERMAL
Status: DISCONTINUED | OUTPATIENT
Start: 2023-12-22 | End: 2023-12-23 | Stop reason: HOSPADM

## 2023-12-22 RX ORDER — CALCIUM GLUCONATE 20 MG/ML
1000 INJECTION, SOLUTION INTRAVENOUS ONCE
Status: COMPLETED | OUTPATIENT
Start: 2023-12-22 | End: 2023-12-22

## 2023-12-22 RX ORDER — CEFDINIR 300 MG/1
300 CAPSULE ORAL EVERY 12 HOURS SCHEDULED
Status: DISCONTINUED | OUTPATIENT
Start: 2023-12-22 | End: 2023-12-23 | Stop reason: HOSPADM

## 2023-12-22 RX ORDER — OXYCODONE HCL 20 MG/ML
10 CONCENTRATE, ORAL ORAL EVERY 4 HOURS PRN
Status: DISCONTINUED | OUTPATIENT
Start: 2023-12-22 | End: 2023-12-23 | Stop reason: HOSPADM

## 2023-12-22 RX ADMIN — METRONIDAZOLE 500 MG: 500 TABLET ORAL at 18:29

## 2023-12-22 RX ADMIN — OCTREOTIDE ACETATE 100 MCG: 100 INJECTION, SOLUTION INTRAVENOUS; SUBCUTANEOUS at 08:13

## 2023-12-22 RX ADMIN — I.V. FAT EMULSION 250 ML: 20 EMULSION INTRAVENOUS at 18:43

## 2023-12-22 RX ADMIN — Medication 10 MG: at 20:09

## 2023-12-22 RX ADMIN — IBUPROFEN 200 MG: 100 SUSPENSION ORAL at 15:44

## 2023-12-22 RX ADMIN — OCTREOTIDE ACETATE 100 MCG: 100 INJECTION, SOLUTION INTRAVENOUS; SUBCUTANEOUS at 15:43

## 2023-12-22 RX ADMIN — CEFEPIME 2000 MG: 2 INJECTION, POWDER, FOR SOLUTION INTRAVENOUS at 01:14

## 2023-12-22 RX ADMIN — HYDROMORPHONE HYDROCHLORIDE 1 MG: 1 INJECTION, SOLUTION INTRAMUSCULAR; INTRAVENOUS; SUBCUTANEOUS at 12:58

## 2023-12-22 RX ADMIN — HYDROMORPHONE HYDROCHLORIDE 1 MG: 1 INJECTION, SOLUTION INTRAMUSCULAR; INTRAVENOUS; SUBCUTANEOUS at 08:32

## 2023-12-22 RX ADMIN — CEFDINIR 300 MG: 300 CAPSULE ORAL at 18:29

## 2023-12-22 RX ADMIN — POTASSIUM CHLORIDE 20 MEQ: 750 TABLET, FILM COATED, EXTENDED RELEASE ORAL at 08:15

## 2023-12-22 RX ADMIN — HYDROMORPHONE HYDROCHLORIDE 1 MG: 1 INJECTION, SOLUTION INTRAMUSCULAR; INTRAVENOUS; SUBCUTANEOUS at 00:12

## 2023-12-22 RX ADMIN — CALCIUM GLUCONATE 1000 MG: 20 INJECTION, SOLUTION INTRAVENOUS at 08:34

## 2023-12-22 RX ADMIN — POTASSIUM PHOSPHATE, MONOBASIC POTASSIUM PHOSPHATE, DIBASIC 15 MMOL: 224; 236 INJECTION, SOLUTION, CONCENTRATE INTRAVENOUS at 10:03

## 2023-12-22 RX ADMIN — ENOXAPARIN SODIUM 40 MG: 100 INJECTION SUBCUTANEOUS at 20:10

## 2023-12-22 RX ADMIN — METRONIDAZOLE 500 MG: 500 INJECTION, SOLUTION INTRAVENOUS at 09:59

## 2023-12-22 RX ADMIN — ACETAMINOPHEN 1000 MG: 500 TABLET ORAL at 15:43

## 2023-12-22 RX ADMIN — FAMOTIDINE: 10 INJECTION INTRAVENOUS at 18:35

## 2023-12-22 RX ADMIN — OCTREOTIDE ACETATE 100 MCG: 100 INJECTION, SOLUTION INTRAVENOUS; SUBCUTANEOUS at 20:10

## 2023-12-22 RX ADMIN — CEFEPIME 2000 MG: 2 INJECTION, POWDER, FOR SOLUTION INTRAVENOUS at 09:52

## 2023-12-22 NOTE — ADT AUTH CERT
Jose Luis Villanueva MD  Physician  Internal Medicine  Progress Notes     Signed  Date of Service:  2023  7:11 AM     Signed        Expand All Collapse All                                                                                                                                                                                                                                                              [address] [facility]     Hospitalist Progress Note     NAME:            Ольга Beltre   :               1975  MRM:              [id number]     Date/Time of service 2023  7:11 AM    To assist coordination of care and communication with nursing and staff, this note may be preliminary early in the day, but finalized by end of the day. Assessment and Plan:      Acute cholecystitis / Leukocytosis - POA, seen by surgery. IR placed drainage tube. ID consulted for Abx plan. Currently on cefepime and flagyl. Cx negative. Not septic. Improving objectively     Abdominal pain / Chronic pain / Peripheral neuropathy / Opiate dependence - POA, Chronic pain requiring narcotics. Unable to wean off narcotics. Patient states that only IV narcotics are effective. Consult palliative care to assist.  May try to add sublingual narcotics, lyrica, etc..  Add motrin, continue tylenol, oxycodone and fentanyl patch. Protein calorie malnutrition / Ileostomy in place / GERD (gastroesophageal reflux disease) / Hypokalemia / Hypophosphatemia - Continue home TPN. Continue octreotide. Adjust K and Phos. Monitor lytes. Add Pepcid. Anemia - POA, stable, suspect chronic disease, but check serologies. Anxiety and depression - Not on medications. Would benefit from counseling.   Consider adding SSRI     Glaucoma - Continue drops      Obese - Advise weight loss once recovered from acute illness         Subjective:      Chief Complaint:  pain     ROS:  (bold if positive, if negative)     Abd

## 2023-12-22 NOTE — PROGRESS NOTES
Brief Nutrition Assessment    Type and Reason for Visit: Reassess    Nutrition Recommendations/Plan:   Brief follow up. Continues to tolerate home TPN at goal, however new c/o abd pain and requiring IV pain meds. Noted new palliative care consult to assist in pain management. Repleting lytes PRN. Continue home cyclic TPN:  2154 mL, 665 g Dextrose, 135 g AA x 14 hours   Hours 1 & 14 69.2 mL/hour  Hours 2 - 13 138.5 mL/hour  Daily intralipids 20% 250 mL   Can make patient NPO if needed, meeting 100% of needs via PN     Cyclic TPN at goal 4153 mL x 14 hours provides 1406 kcal (+ lipids, 1906 kcal/day, 100% needs); 255 g carbs; 135 g protein (100% needs). Last BM: 12/19/23  Edema: None                    Nutr. Labs:    Lab Results   Component Value Date    CREATININE 0.65 12/22/2023    BUN 16 12/22/2023     12/22/2023    K 3.3 (L) 12/22/2023     (H) 12/22/2023    CO2 27 12/22/2023       No results found for: \"POCGLU\"     No results found for: \"LABA1C\", \"DQD1ZBQE\"    Lab Results   Component Value Date/Time    MG 2.2 12/22/2023 03:18 AM       Lab Results   Component Value Date    CALCIUM 8.2 (L) 12/22/2023    PHOS 2.4 (L) 12/22/2023        Lab Results   Component Value Date    TRIG 89 12/19/2023       Nutr.  Meds:  Scheduled Meds:   potassium phosphate IVPB (PERIPHERAL LINE)  15 mmol IntraVENous Once    ibuprofen  200 mg Oral q8h    fentaNYL  1 patch TransDERmal Q72H    cefepime  2,000 mg IntraVENous Q8H    metroNIDAZOLE  500 mg IntraVENous Q8H    acetaminophen  1,000 mg Oral TID    octreotide  100 mcg SubCUTAneous Q8H    fat emulsion  250 mL IntraVENous Daily    lactated ringers bolus  1,000 mL IntraVENous Once    sodium chloride flush  5-40 mL IntraVENous 2 times per day    enoxaparin  40 mg SubCUTAneous Q24H     Continuous Infusions:   Adult TPN 2-in-1 - Central Line (Cyclic Custom) without Lipids      sodium chloride Stopped (12/17/23 0617)     PRN Meds: oxyCODONE, HYDROmorphone, naloxone, gadoteridol, sodium chloride flush, sodium chloride, ondansetron **OR** ondansetron, acetaminophen **OR** acetaminophen        Estimated Nutrition Needs:   Energy Requirements Based On: Formula  Weight Used for Energy Requirements: Current  Energy (kcal/day): 1902 (MSJ x 1.3)  Weight Used for Protein Requirements: Current  Protein (g/day): 103 (1.2 g/kg)     Fluid (ml/day): 1902      Electronically signed by Connie Pritchett MS, RD   Contact: Ext: [phone], or via [employer]

## 2023-12-22 NOTE — PLAN OF CARE
Problem: Pain  Goal: Verbalizes/displays adequate comfort level or baseline comfort level  12/22/2023 0901 by Raul Lam RN  Outcome: Progressing  12/21/2023 2050 by Yajaira Daniels RN  Outcome: Progressing     Problem: Skin/Tissue Integrity  Goal: Absence of new skin breakdown  Description: 1. Monitor for areas of redness and/or skin breakdown  2. Assess vascular access sites hourly  3. Every 4-6 hours minimum:  Change oxygen saturation probe site  4. Every 4-6 hours:  If on nasal continuous positive airway pressure, respiratory therapy assess nares and determine need for appliance change or resting period.   Outcome: Progressing     Problem: Safety - Adult  Goal: Free from fall injury  Outcome: Progressing     Problem: Nutrition Deficit:  Goal: Optimize nutritional status  Outcome: Progressing

## 2023-12-22 NOTE — WOUND CARE
Wound follow up:  Fistula appliance changed , with single appliance, paste, powder , ring and outer wings. Stoma red/moist, moderate amount of thick brownish tan drainage, no odor. Brandy-stomal area clean, no redness or breakdown.   Will follow  Kristian Padilla

## 2023-12-22 NOTE — CARE COORDINATION
Per chart review, Pt is currently open to [address]. Resumption of care orders have been sent. CM to continue to follow for discharge planning needs.        Johnathan KumarA[facility], [address] [facility]  [phone]

## 2023-12-22 NOTE — PROGRESS NOTES
1  RN made Dr. Norma Londono aware that patient said she felt dizzy and like she needed electrolytes, taking stool softener and increase in liquid stool from ileostomy may be contributing to how she is feeling. No new orders    1201: made Dr Norma Londono aware that patient requests to see MD now and that she feels just as bad as when she first got here. MD advised would come see patient asap.     1321:made dr wheat aware that patient requesting increase in dose of fentanyl patch. MD reviewing.    3385: made Dr. Norma Londono aware patient requesting to have fentanyl patch dose increased, and a pain med that is IV prn for pain. Asked MD if would like to have heparin ordered to maintain PICC line. MD increased dose of oxycodone po prn    0045: made Dr Norma Londono aware patient states the oral medications are causing her further abdominal pain and requests IV pain med be ordered.  MD reviewing

## 2023-12-23 VITALS
TEMPERATURE: 97.7 F | OXYGEN SATURATION: 97 % | RESPIRATION RATE: 17 BRPM | WEIGHT: 188.93 LBS | HEART RATE: 71 BPM | HEIGHT: 64 IN | DIASTOLIC BLOOD PRESSURE: 105 MMHG | SYSTOLIC BLOOD PRESSURE: 142 MMHG | BODY MASS INDEX: 32.26 KG/M2

## 2023-12-23 LAB
ALBUMIN SERPL-MCNC: 2.8 G/DL (ref 3.5–5)
ALBUMIN/GLOB SERPL: 0.7 (ref 1.1–2.2)
ALP SERPL-CCNC: 105 U/L (ref 45–117)
ALT SERPL-CCNC: 25 U/L (ref 12–78)
ANION GAP SERPL CALC-SCNC: 5 MMOL/L (ref 5–15)
AST SERPL-CCNC: 19 U/L (ref 15–37)
BACTERIA SPEC CULT: NORMAL
BACTERIA SPEC CULT: NORMAL
BILIRUB SERPL-MCNC: 0.3 MG/DL (ref 0.2–1)
BUN SERPL-MCNC: 17 MG/DL (ref 6–20)
BUN/CREAT SERPL: 26 (ref 12–20)
CALCIUM SERPL-MCNC: 8.1 MG/DL (ref 8.5–10.1)
CHLORIDE SERPL-SCNC: 111 MMOL/L (ref 97–108)
CO2 SERPL-SCNC: 26 MMOL/L (ref 21–32)
CREAT SERPL-MCNC: 0.65 MG/DL (ref 0.55–1.02)
ERYTHROCYTE [DISTWIDTH] IN BLOOD BY AUTOMATED COUNT: 15.3 % (ref 11.5–14.5)
EST. AVERAGE GLUCOSE BLD GHB EST-MCNC: 111 MG/DL
FERRITIN SERPL-MCNC: 124 NG/ML (ref 8–252)
FOLATE SERPL-MCNC: 14.4 NG/ML (ref 5–21)
GLOBULIN SER CALC-MCNC: 4.2 G/DL (ref 2–4)
GLUCOSE SERPL-MCNC: 123 MG/DL (ref 65–100)
HAPTOGLOB SERPL-MCNC: 259 MG/DL (ref 30–200)
HBA1C MFR BLD: 5.5 % (ref 4–5.6)
HCT VFR BLD AUTO: 31.4 % (ref 35–47)
HGB BLD-MCNC: 10.1 G/DL (ref 11.5–16)
IRON SATN MFR SERPL: 22 % (ref 20–50)
IRON SERPL-MCNC: 53 UG/DL (ref 35–150)
LDH SERPL L TO P-CCNC: 245 U/L (ref 81–246)
MAGNESIUM SERPL-MCNC: 2.2 MG/DL (ref 1.6–2.4)
MCH RBC QN AUTO: 22.5 PG (ref 26–34)
MCHC RBC AUTO-ENTMCNC: 32.2 G/DL (ref 30–36.5)
MCV RBC AUTO: 69.9 FL (ref 80–99)
NRBC # BLD: 0 K/UL (ref 0–0.01)
NRBC BLD-RTO: 0 PER 100 WBC
PHOSPHATE SERPL-MCNC: 3 MG/DL (ref 2.6–4.7)
PLATELET # BLD AUTO: 204 K/UL (ref 150–400)
POTASSIUM SERPL-SCNC: 3.7 MMOL/L (ref 3.5–5.1)
PROT SERPL-MCNC: 7 G/DL (ref 6.4–8.2)
RBC # BLD AUTO: 4.49 M/UL (ref 3.8–5.2)
SERVICE CMNT-IMP: NORMAL
SERVICE CMNT-IMP: NORMAL
SODIUM SERPL-SCNC: 142 MMOL/L (ref 136–145)
TIBC SERPL-MCNC: 242 UG/DL (ref 250–450)
VIT B12 SERPL-MCNC: 1418 PG/ML (ref 193–986)
WBC # BLD AUTO: 5 K/UL (ref 3.6–11)

## 2023-12-23 PROCEDURE — 82728 ASSAY OF FERRITIN: CPT

## 2023-12-23 PROCEDURE — 84100 ASSAY OF PHOSPHORUS: CPT

## 2023-12-23 PROCEDURE — 83550 IRON BINDING TEST: CPT

## 2023-12-23 PROCEDURE — 2580000003 HC RX 258: Performed by: FAMILY MEDICINE

## 2023-12-23 PROCEDURE — 83010 ASSAY OF HAPTOGLOBIN QUANT: CPT

## 2023-12-23 PROCEDURE — 80053 COMPREHEN METABOLIC PANEL: CPT

## 2023-12-23 PROCEDURE — 6370000000 HC RX 637 (ALT 250 FOR IP): Performed by: INTERNAL MEDICINE

## 2023-12-23 PROCEDURE — 6370000000 HC RX 637 (ALT 250 FOR IP): Performed by: STUDENT IN AN ORGANIZED HEALTH CARE EDUCATION/TRAINING PROGRAM

## 2023-12-23 PROCEDURE — 85027 COMPLETE CBC AUTOMATED: CPT

## 2023-12-23 PROCEDURE — 6360000002 HC RX W HCPCS: Performed by: STUDENT IN AN ORGANIZED HEALTH CARE EDUCATION/TRAINING PROGRAM

## 2023-12-23 PROCEDURE — 83735 ASSAY OF MAGNESIUM: CPT

## 2023-12-23 PROCEDURE — 82607 VITAMIN B-12: CPT

## 2023-12-23 PROCEDURE — 36415 COLL VENOUS BLD VENIPUNCTURE: CPT

## 2023-12-23 PROCEDURE — 83036 HEMOGLOBIN GLYCOSYLATED A1C: CPT

## 2023-12-23 PROCEDURE — 83540 ASSAY OF IRON: CPT

## 2023-12-23 PROCEDURE — 83615 LACTATE (LD) (LDH) ENZYME: CPT

## 2023-12-23 PROCEDURE — 82746 ASSAY OF FOLIC ACID SERUM: CPT

## 2023-12-23 RX ORDER — CEFDINIR 300 MG/1
300 CAPSULE ORAL EVERY 12 HOURS SCHEDULED
Qty: 14 CAPSULE | Refills: 0 | Status: SHIPPED | OUTPATIENT
Start: 2023-12-23 | End: 2023-12-30

## 2023-12-23 RX ORDER — FENTANYL 25 UG/1
1 PATCH TRANSDERMAL
Qty: 10 PATCH | Refills: 0 | Status: SHIPPED | OUTPATIENT
Start: 2023-12-25 | End: 2024-01-24

## 2023-12-23 RX ORDER — OCTREOTIDE ACETATE 100 UG/ML
100 INJECTION, SOLUTION INTRAVENOUS; SUBCUTANEOUS EVERY 8 HOURS
Qty: 90 ML | Refills: 0 | Status: SHIPPED | OUTPATIENT
Start: 2023-12-23 | End: 2024-01-22

## 2023-12-23 RX ORDER — OXYCODONE HCL 20 MG/ML
10 CONCENTRATE, ORAL ORAL EVERY 4 HOURS PRN
Qty: 30 ML | Refills: 0 | Status: SHIPPED | OUTPATIENT
Start: 2023-12-23 | End: 2024-01-02

## 2023-12-23 RX ORDER — METRONIDAZOLE 500 MG/1
500 TABLET ORAL EVERY 8 HOURS
Qty: 21 TABLET | Refills: 0 | Status: SHIPPED | OUTPATIENT
Start: 2023-12-23 | End: 2023-12-30

## 2023-12-23 RX ADMIN — IBUPROFEN 200 MG: 100 SUSPENSION ORAL at 02:29

## 2023-12-23 RX ADMIN — CEFDINIR 300 MG: 300 CAPSULE ORAL at 07:26

## 2023-12-23 RX ADMIN — ACETAMINOPHEN 1000 MG: 500 TABLET ORAL at 09:54

## 2023-12-23 RX ADMIN — SODIUM CHLORIDE, PRESERVATIVE FREE 10 ML: 5 INJECTION INTRAVENOUS at 09:54

## 2023-12-23 RX ADMIN — IBUPROFEN 200 MG: 100 SUSPENSION ORAL at 07:26

## 2023-12-23 RX ADMIN — METRONIDAZOLE 500 MG: 500 TABLET ORAL at 09:54

## 2023-12-23 RX ADMIN — OCTREOTIDE ACETATE 100 MCG: 100 INJECTION, SOLUTION INTRAVENOUS; SUBCUTANEOUS at 07:27

## 2023-12-23 RX ADMIN — METRONIDAZOLE 500 MG: 500 TABLET ORAL at 02:29

## 2023-12-23 NOTE — PROGRESS NOTES
[facility]  [address] [place] [address], [address] [place] [id number]      To Who it concerns    Ms Oscar Pino should be excused from work until  January 10, 2024. She was under my care at [facility] from 12/16/2023 to 12/23/23. Please feel free to call me if you have any questions.     Dr Nehemiah Daily  [facility]

## 2023-12-23 NOTE — DISCHARGE SUMMARY
mucous membranes  Neck:  Supple, without masses, thyroid non-tender  Resp:  No accessory muscle use, clear breath sounds without wheezes rales or rhonchi  Card:  No murmurs, normal S1, S2 without thrills, bruits or peripheral edema  Abd:  Soft, non-tender, non-distended, normoactive bowel sounds are present, no mass  Lymph:  No cervical or inguinal adenopathy  Musc:  No cyanosis or clubbing  Skin:  No rashes or ulcers, skin turgor is good  Neuro:  Cranial nerves are grossly intact, no focal motor weakness, follows commands   Psych:  Good insight, oriented to person, place and time, alert    Patient Instructions:   Current Discharge Medication List        START taking these medications    Details   fentaNYL (DURAGESIC) 25 MCG/HR Place 1 patch onto the skin every 72 hours for 30 days. Max Daily Amount: 1 patch  Qty: 10 patch, Refills: 0    Comments: Reduce doses taken as pain becomes manageable  Associated Diagnoses: Acute cholecystitis      oxyCODONE (ROXICODONE INTENSOL) 100 MG/5ML concentrated solution Take 0.5 mLs by mouth every 4 hours as needed for Pain for up to 10 days. Max Daily Amount: 60 mg  Qty: 30 mL, Refills: 0    Comments: Reduce doses taken as pain becomes manageable  Associated Diagnoses: Acute cholecystitis      ibuprofen (ADVIL;MOTRIN) 100 MG/5ML suspension Take 10 mLs by mouth every 8 (eight) hours  Qty: 240 mL, Refills: 3      cefdinir (OMNICEF) 300 MG capsule Take 1 capsule by mouth every 12 hours for 7 days  Qty: 14 capsule, Refills: 0      metroNIDAZOLE (FLAGYL) 500 MG tablet Take 1 tablet by mouth in the morning and 1 tablet at noon and 1 tablet in the evening. Do all this for 7 days. Qty: 21 tablet, Refills: 0      octreotide (SANDOSTATIN) 100 MCG/ML SOLN injection Inject 1 mL into the skin in the morning and 1 mL at noon and 1 mL in the evening.   Qty: 90 mL, Refills: 0           CONTINUE these medications which have NOT CHANGED    Details   brimonidine-timolol (COMBIGAN) 0.2-0.5 % ophthalmic solution Apply 1 drop to eye 2 times daily      brinzolamide (AZOPT) 1 % ophthalmic suspension Apply 1 drop to eye 2 times daily      netarsudil-Latanoprost (ROCKLATAN) 0.02-0.005 % ophthalmic solution ceived the following from [facility]: Outside name: Midjules Carias 0.02-0.005 % drop           STOP taking these medications       bimatoprost (LUMIGAN) 0.03 % ophthalmic drops Comments:   Reason for Stopping:         clomiPHENE (CLOMID) 50 MG tablet Comments:   Reason for Stopping:             Activity: activity as tolerated  Diet:  TPN as arranged by [facility]  Wound Care: routine catheter care and as directed    Follow-up with your PCP and surgery at [address] [place] [address] in a few weeks.   Follow-up tests/labs - none    Signed:  Jorge Simmons MD  12/23/2023  9:27 AM

## 2025-07-05 ENCOUNTER — APPOINTMENT (OUTPATIENT)
Facility: HOSPITAL | Age: 50
End: 2025-07-05
Payer: OTHER GOVERNMENT

## 2025-07-05 ENCOUNTER — HOSPITAL ENCOUNTER (EMERGENCY)
Facility: HOSPITAL | Age: 50
Discharge: HOME | End: 2025-07-05
Attending: EMERGENCY MEDICINE
Payer: OTHER GOVERNMENT

## 2025-07-05 VITALS
RESPIRATION RATE: 18 BRPM | WEIGHT: 135 LBS | TEMPERATURE: 98.2 F | HEIGHT: 66 IN | HEART RATE: 76 BPM | SYSTOLIC BLOOD PRESSURE: 139 MMHG | OXYGEN SATURATION: 98 % | DIASTOLIC BLOOD PRESSURE: 94 MMHG | BODY MASS INDEX: 21.69 KG/M2

## 2025-07-05 DIAGNOSIS — S16.1XXA STRAIN OF NECK MUSCLE, INITIAL ENCOUNTER: ICD-10-CM
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
## 2025-07-05 DIAGNOSIS — S39.012A STRAIN OF LUMBAR REGION, INITIAL ENCOUNTER: ICD-10-CM

## 2025-07-05 PROCEDURE — 72131 CT LUMBAR SPINE W/O DYE: CPT

## 2025-07-05 PROCEDURE — 6370000000 HC RX 637 (ALT 250 FOR IP)

## 2025-07-05 PROCEDURE — 72125 CT NECK SPINE W/O DYE: CPT

## 2025-07-05 PROCEDURE — 99284 EMERGENCY DEPT VISIT MOD MDM: CPT

## 2025-07-05 RX ORDER — LIDOCAINE 4 G/G
1 PATCH TOPICAL DAILY
Qty: 30 PATCH | Refills: 0 | Status: SHIPPED | OUTPATIENT
Start: 2025-07-05 | End: 2025-08-04

## 2025-07-05 RX ORDER — PREDNISONE 10 MG/1
10 TABLET ORAL DAILY
Qty: 10 TABLET | Refills: 0 | Status: SHIPPED | OUTPATIENT
Start: 2025-07-05 | End: 2025-07-15

## 2025-07-05 RX ORDER — METHOCARBAMOL 750 MG/1
750 TABLET, FILM COATED ORAL 4 TIMES DAILY
Qty: 40 TABLET | Refills: 0 | Status: SHIPPED | OUTPATIENT
Start: 2025-07-05 | End: 2025-07-15

## 2025-07-05 RX ORDER — LIDOCAINE 4 G/G
1 PATCH TOPICAL ONCE
Status: DISCONTINUED | OUTPATIENT
Start: 2025-07-05 | End: 2025-07-05 | Stop reason: HOSPADM

## 2025-07-05 RX ORDER — METHOCARBAMOL 500 MG/1
1500 TABLET, FILM COATED ORAL
Status: COMPLETED | OUTPATIENT
Start: 2025-07-05 | End: 2025-07-05

## 2025-07-05 RX ORDER — PREDNISONE 20 MG/1
40 TABLET ORAL
Status: COMPLETED | OUTPATIENT
Start: 2025-07-05 | End: 2025-07-05

## 2025-07-05 RX ORDER — ACETAMINOPHEN 500 MG
1000 TABLET ORAL
Status: COMPLETED | OUTPATIENT
Start: 2025-07-05 | End: 2025-07-05

## 2025-07-05 RX ADMIN — METHOCARBAMOL TABLETS 1500 MG: 500 TABLET, COATED ORAL at 17:18

## 2025-07-05 RX ADMIN — ACETAMINOPHEN 1000 MG: 500 TABLET ORAL at 17:18

## 2025-07-05 RX ADMIN — PREDNISONE 40 MG: 20 TABLET ORAL at 17:19

## 2025-07-05 ASSESSMENT — PAIN DESCRIPTION - ORIENTATION
ORIENTATION: ANTERIOR
ORIENTATION: ANTERIOR

## 2025-07-05 ASSESSMENT — PAIN DESCRIPTION - DESCRIPTORS
DESCRIPTORS: ACHING

## 2025-07-05 ASSESSMENT — PAIN - FUNCTIONAL ASSESSMENT
PAIN_FUNCTIONAL_ASSESSMENT: 0-10
PAIN_FUNCTIONAL_ASSESSMENT: PREVENTS OR INTERFERES SOME ACTIVE ACTIVITIES AND ADLS
PAIN_FUNCTIONAL_ASSESSMENT: 0-10

## 2025-07-05 ASSESSMENT — PAIN SCALES - GENERAL
PAINLEVEL_OUTOF10: 8
PAINLEVEL_OUTOF10: 4
PAINLEVEL_OUTOF10: 8

## 2025-07-05 ASSESSMENT — PAIN DESCRIPTION - LOCATION
LOCATION: HEAD;NECK;BACK
LOCATION: HEAD;NECK
LOCATION: NECK;BACK

## 2025-07-05 ASSESSMENT — LIFESTYLE VARIABLES
HOW OFTEN DO YOU HAVE A DRINK CONTAINING ALCOHOL: NEVER
HOW MANY STANDARD DRINKS CONTAINING ALCOHOL DO YOU HAVE ON A TYPICAL DAY: PATIENT DOES NOT DRINK

## 2025-07-05 ASSESSMENT — PAIN DESCRIPTION - PAIN TYPE
TYPE: ACUTE PAIN
TYPE: ACUTE PAIN
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
## 2025-07-05 NOTE — DISCHARGE INSTRUCTIONS
As discussed today, imaging did not show any evidence of acute fracture or dislocation.  Please take Robaxin up to 4 times a day as needed for muscle pain and take over-the-counter Tylenol every 4-6 hours.  Please take prednisone as prescribed and apply lidocaine patches to the low back in 12-hour intervals.  Schedule follow-up with orthopedics if pain does not go away.

## 2025-08-01 ENCOUNTER — TRANSCRIBE ORDERS (OUTPATIENT)
Facility: HOSPITAL | Age: 50
End: 2025-08-01

## 2025-08-01 DIAGNOSIS — M54.16 LUMBAR RADICULOPATHY: Primary | ICD-10-CM

## 2025-08-14 ENCOUNTER — HOSPITAL ENCOUNTER (OUTPATIENT)
Facility: HOSPITAL | Age: 50
Discharge: HOME | End: 2025-08-17
Attending: ORTHOPAEDIC SURGERY
Payer: OTHER GOVERNMENT

## 2025-08-14 DIAGNOSIS — M54.16 LUMBAR RADICULOPATHY: ICD-10-CM

## 2025-08-14 PROCEDURE — 72148 MRI LUMBAR SPINE W/O DYE: CPT
